# Patient Record
Sex: FEMALE | Race: WHITE | Employment: OTHER | URBAN - METROPOLITAN AREA
[De-identification: names, ages, dates, MRNs, and addresses within clinical notes are randomized per-mention and may not be internally consistent; named-entity substitution may affect disease eponyms.]

---

## 2017-01-16 ENCOUNTER — OFFICE VISIT (OUTPATIENT)
Dept: DERMATOLOGY CLINIC | Facility: CLINIC | Age: 62
End: 2017-01-16

## 2017-01-16 DIAGNOSIS — L82.1 SEBORRHEIC KERATOSES: ICD-10-CM

## 2017-01-16 DIAGNOSIS — D23.4 BENIGN NEOPLASM OF SCALP AND SKIN OF NECK: ICD-10-CM

## 2017-01-16 DIAGNOSIS — D23.60 BENIGN NEOPLASM OF SKIN OF UPPER LIMB, INCLUDING SHOULDER, UNSPECIFIED LATERALITY: ICD-10-CM

## 2017-01-16 DIAGNOSIS — D48.5 NEOPLASM OF UNCERTAIN BEHAVIOR OF SKIN: Primary | ICD-10-CM

## 2017-01-16 DIAGNOSIS — D23.30 BENIGN NEOPLASM OF SKIN OF FACE: ICD-10-CM

## 2017-01-16 DIAGNOSIS — D23.5 BENIGN NEOPLASM OF SKIN OF TRUNK, EXCEPT SCROTUM: ICD-10-CM

## 2017-01-16 PROCEDURE — 11100 BIOPSY OF SKIN LESION: CPT | Performed by: DERMATOLOGY

## 2017-01-16 PROCEDURE — 99202 OFFICE O/P NEW SF 15 MIN: CPT | Performed by: DERMATOLOGY

## 2017-01-17 ENCOUNTER — TELEPHONE (OUTPATIENT)
Dept: DERMATOLOGY CLINIC | Facility: CLINIC | Age: 62
End: 2017-01-17

## 2017-01-18 NOTE — TELEPHONE ENCOUNTER
Please send letter. SK  rtc 1 yr or prn. Please check off in log book letter sent. Please send report to scan.

## 2017-01-25 ENCOUNTER — OFFICE VISIT (OUTPATIENT)
Dept: DERMATOLOGY CLINIC | Facility: CLINIC | Age: 62
End: 2017-01-25

## 2017-01-25 ENCOUNTER — TELEPHONE (OUTPATIENT)
Dept: DERMATOLOGY CLINIC | Facility: CLINIC | Age: 62
End: 2017-01-25

## 2017-01-25 DIAGNOSIS — T14.8XXA WOUND INFECTION: Primary | ICD-10-CM

## 2017-01-25 DIAGNOSIS — L08.9 WOUND INFECTION: Primary | ICD-10-CM

## 2017-01-25 RX ORDER — AZITHROMYCIN 250 MG/1
TABLET, FILM COATED ORAL
Qty: 6 TABLET | Refills: 0 | Status: SHIPPED | OUTPATIENT
Start: 2017-01-25 | End: 2017-03-19

## 2017-01-25 NOTE — TELEPHONE ENCOUNTER
Pt calling, states her bx site from last visit with Chace Rowan is red, painful and swollen - denies pus, fever/chills.  appt made for today with KMT to further eval

## 2017-01-30 ENCOUNTER — TELEPHONE (OUTPATIENT)
Dept: INTERNAL MEDICINE CLINIC | Facility: CLINIC | Age: 62
End: 2017-01-30

## 2017-01-30 DIAGNOSIS — E55.9 VITAMIN D DEFICIENCY: Primary | ICD-10-CM

## 2017-01-30 DIAGNOSIS — R74.8 ELEVATED ALKALINE PHOSPHATASE LEVEL: ICD-10-CM

## 2017-01-30 NOTE — TELEPHONE ENCOUNTER
Nursing , please send order today for left on cart to patient. Thank you. (  Discussed with patient. He has low level elevation in alkaline phosphatase. GGT unremarkable.   We will send order for fractionated alkaline phosphatase along with repeat miguel

## 2017-01-31 NOTE — PROGRESS NOTES
Nella Aceves is a 60year old female. HPI:     CC:  Patient presents with:  Moles: New pt presenting with mole on back. Pt denies personal and family hx of skin cancer.         Allergies:  Penicillin G; Penicillins    HISTORY:    Past Medical History Yes    Comment: Coffee 2 cups daily;  Soda    Pt has a pacemaker No    Pt has a defibrillator No    Reaction to local anesthetic No     Social History Narrative     Family History   Problem Relation Age of Onset   • Alcohol and Other Disorders Associated Fa defined types were placed in this encounter.        Meds & Refills for this Visit:  No prescriptions requested or ordered in this encounter      Neoplasm of uncertain behavior of skin  (primary encounter diagnosis)  Seborrheic keratoses  Benign neoplasm of

## 2017-02-12 NOTE — PROGRESS NOTES
Gerry Hernandez is a 60year old female. Patient presents with:  Lesion: Pt had bx at L upper back 1-16. Area tender with pink ring around and yellow scab. Pt only covered for 2 days tho lesion is under clothes.   \"I just followed the written d file  Other Topics Concern    Caffeine Concern Yes    Comment: Coffee 2 cups daily;  Soda    Pt has a pacemaker No    Pt has a defibrillator No    Reaction to local anesthetic No     Social History Narrative     Family History   Problem Relation Age of Onse melanoma briefly reviewed. Sunscreen use, sun protection, encouraged. Followup as noted in rtc or p.r.n. No orders of the defined types were placed in this encounter.        Meds & Refills for this Visit:   Signed Prescriptions Disp Refills    azithrom

## 2017-02-20 ENCOUNTER — LAB ENCOUNTER (OUTPATIENT)
Dept: LAB | Facility: HOSPITAL | Age: 62
End: 2017-02-20
Attending: INTERNAL MEDICINE
Payer: COMMERCIAL

## 2017-02-20 DIAGNOSIS — E55.9 VITAMIN D DEFICIENCY: ICD-10-CM

## 2017-02-20 DIAGNOSIS — R74.8 ELEVATED ALKALINE PHOSPHATASE LEVEL: ICD-10-CM

## 2017-02-20 PROCEDURE — 82306 VITAMIN D 25 HYDROXY: CPT

## 2017-02-20 PROCEDURE — 36415 COLL VENOUS BLD VENIPUNCTURE: CPT

## 2017-02-20 PROCEDURE — 84080 ASSAY ALKALINE PHOSPHATASES: CPT

## 2017-02-20 PROCEDURE — 84075 ASSAY ALKALINE PHOSPHATASE: CPT

## 2017-02-21 PROCEDURE — 88175 CYTOPATH C/V AUTO FLUID REDO: CPT | Performed by: OBSTETRICS & GYNECOLOGY

## 2017-02-21 PROCEDURE — 87624 HPV HI-RISK TYP POOLED RSLT: CPT | Performed by: OBSTETRICS & GYNECOLOGY

## 2017-02-22 ENCOUNTER — LAB REQUISITION (OUTPATIENT)
Dept: LAB | Facility: HOSPITAL | Age: 62
End: 2017-02-22
Payer: COMMERCIAL

## 2017-02-22 DIAGNOSIS — Z01.419 ENCOUNTER FOR GYNECOLOGICAL EXAMINATION WITHOUT ABNORMAL FINDING: ICD-10-CM

## 2017-02-22 LAB — 25(OH)D3 SERPL-MCNC: 23.1 NG/ML

## 2017-02-24 LAB — HPV I/H RISK 1 DNA SPEC QL NAA+PROBE: NEGATIVE

## 2017-02-25 ENCOUNTER — TELEPHONE (OUTPATIENT)
Dept: INTERNAL MEDICINE CLINIC | Facility: CLINIC | Age: 62
End: 2017-02-25

## 2017-02-25 DIAGNOSIS — E55.9 VITAMIN D DEFICIENCY: Primary | ICD-10-CM

## 2017-02-25 NOTE — TELEPHONE ENCOUNTER
Please notify patient that her recent labs showed that  the one enzyme called alkaline phosphatase is now normal.  It would be wise to recheck that in 6 months to make sure it remains in the normal range. I will send her an order form for that.   Her vitam

## 2017-03-07 NOTE — TELEPHONE ENCOUNTER
DUKE Quiroga MD message to patient. Patient verbalized understanding. Patient states she has not been taking her Vitamin D3 regularly. Patient states when she does take it she is taking Vitamin D 3 1000 IU 1 tab daily.  Patient is not taking ca

## 2017-03-12 NOTE — TELEPHONE ENCOUNTER
Noted. Please have patient take her vitamin D 2000 units 1 a day. She does not have to take her calcium since it upsets her stomach. Send order for vitamin D to be done in 3  months.   Also I did check with Me the  at that makes the shingles

## 2017-03-15 NOTE — TELEPHONE ENCOUNTER
Patient called back - relayed DR. MOREJON message and she verbalized understanding.  Patient states she probably will go ahead and get the shingles vaccine

## 2017-03-19 ENCOUNTER — HOSPITAL ENCOUNTER (OUTPATIENT)
Age: 62
Discharge: HOME | End: 2017-03-19
Payer: COMMERCIAL

## 2017-03-19 VITALS
OXYGEN SATURATION: 99 % | HEART RATE: 59 BPM | BODY MASS INDEX: 21.71 KG/M2 | SYSTOLIC BLOOD PRESSURE: 115 MMHG | DIASTOLIC BLOOD PRESSURE: 59 MMHG | HEIGHT: 62 IN | RESPIRATION RATE: 16 BRPM | WEIGHT: 118 LBS | TEMPERATURE: 99 F

## 2017-03-19 DIAGNOSIS — B96.89 ACUTE BACTERIAL PHARYNGITIS: Primary | ICD-10-CM

## 2017-03-19 DIAGNOSIS — J02.8 ACUTE BACTERIAL PHARYNGITIS: Primary | ICD-10-CM

## 2017-03-19 LAB — S PYO AG THROAT QL: NEGATIVE

## 2017-03-19 PROCEDURE — 87430 STREP A AG IA: CPT

## 2017-03-19 PROCEDURE — 99214 OFFICE O/P EST MOD 30 MIN: CPT

## 2017-03-19 PROCEDURE — 99213 OFFICE O/P EST LOW 20 MIN: CPT

## 2017-03-19 RX ORDER — AZITHROMYCIN 500 MG/1
500 TABLET, FILM COATED ORAL DAILY
Qty: 5 TABLET | Refills: 0 | Status: SHIPPED | OUTPATIENT
Start: 2017-03-19 | End: 2017-03-24

## 2017-03-19 NOTE — ED PROVIDER NOTES
Patient presents with:  Sore Throat      HPI:     Garcia Solano is a 60year old female who presents for evaluation of a chief complaint of sore throat and body aches for the past 2 days. The patient was exposed to strep throat at work.   She denies a obvious rashes  NECK: supple, no adenopathy, no thyromegaly  CARDIO: RRR without murmur  EXTREMITIES: no cyanosis, clubbing or edema  HEAD: normocephalic, atraumatic  EYES: sclera non icteric bilateral, conjunctiva clear  EARS: TM  bilateral: normal  NOSE:

## 2017-03-19 NOTE — ED INITIAL ASSESSMENT (HPI)
Cold symptoms for a couple days. Sore throat became worse last night. 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 Low grade temp and chills. No N/V/D. Denies SOB.

## 2017-05-02 ENCOUNTER — HOSPITAL ENCOUNTER (OUTPATIENT)
Dept: BONE DENSITY | Facility: HOSPITAL | Age: 62
Discharge: HOME | End: 2017-05-02
Attending: OBSTETRICS & GYNECOLOGY
Payer: COMMERCIAL

## 2017-05-02 ENCOUNTER — HOSPITAL ENCOUNTER (OUTPATIENT)
Dept: MAMMOGRAPHY | Facility: HOSPITAL | Age: 62
Discharge: HOME | End: 2017-05-02
Attending: OBSTETRICS & GYNECOLOGY
Payer: COMMERCIAL

## 2017-05-02 DIAGNOSIS — Z78.0 MENOPAUSE: ICD-10-CM

## 2017-05-02 DIAGNOSIS — Z12.31 ENCOUNTER FOR SCREENING MAMMOGRAM FOR MALIGNANT NEOPLASM OF BREAST: ICD-10-CM

## 2017-05-02 PROCEDURE — 77067 SCR MAMMO BI INCL CAD: CPT

## 2017-05-02 PROCEDURE — 77080 DXA BONE DENSITY AXIAL: CPT

## 2017-06-05 ENCOUNTER — TELEPHONE (OUTPATIENT)
Dept: INTERNAL MEDICINE CLINIC | Facility: CLINIC | Age: 62
End: 2017-06-05

## 2017-06-05 NOTE — TELEPHONE ENCOUNTER
Copy of most recent Vit. D results (2/20/17) and TSH results (11/14/16) faxed to [facility] as requested

## 2017-11-02 ENCOUNTER — TELEPHONE (OUTPATIENT)
Dept: INTERNAL MEDICINE CLINIC | Facility: CLINIC | Age: 62
End: 2017-11-02

## 2017-11-02 NOTE — TELEPHONE ENCOUNTER
Please notify patient that her labs that I received came out good. Chemistries good. Cholesterol good. Vitamin D good.   Last physical was in December so it may be wise for her her to see me in December for updated physical.  She can call for an appointm

## 2017-11-17 NOTE — TELEPHONE ENCOUNTER
Patient called - relayed TAMMY MOREJON message and she verbalized understanding. Patient was at work so she will call to schedule appt.

## 2018-01-23 ENCOUNTER — OFFICE VISIT (OUTPATIENT)
Dept: INTERNAL MEDICINE CLINIC | Facility: CLINIC | Age: 63
End: 2018-01-23

## 2018-01-23 ENCOUNTER — TELEPHONE (OUTPATIENT)
Dept: INTERNAL MEDICINE CLINIC | Facility: CLINIC | Age: 63
End: 2018-01-23

## 2018-01-23 ENCOUNTER — HOSPITAL ENCOUNTER (OUTPATIENT)
Dept: GENERAL RADIOLOGY | Age: 63
Discharge: HOME | End: 2018-01-23
Attending: INTERNAL MEDICINE
Payer: COMMERCIAL

## 2018-01-23 VITALS
HEIGHT: 62 IN | HEART RATE: 69 BPM | TEMPERATURE: 98 F | SYSTOLIC BLOOD PRESSURE: 102 MMHG | WEIGHT: 117 LBS | OXYGEN SATURATION: 98 % | DIASTOLIC BLOOD PRESSURE: 70 MMHG | BODY MASS INDEX: 21.53 KG/M2

## 2018-01-23 DIAGNOSIS — J06.9 ACUTE UPPER RESPIRATORY INFECTION, UNSPECIFIED: Primary | ICD-10-CM

## 2018-01-23 DIAGNOSIS — J06.9 ACUTE UPPER RESPIRATORY INFECTION, UNSPECIFIED: ICD-10-CM

## 2018-01-23 PROCEDURE — 71046 X-RAY EXAM CHEST 2 VIEWS: CPT | Performed by: INTERNAL MEDICINE

## 2018-01-23 PROCEDURE — 99212 OFFICE O/P EST SF 10 MIN: CPT | Performed by: INTERNAL MEDICINE

## 2018-01-23 PROCEDURE — 99213 OFFICE O/P EST LOW 20 MIN: CPT | Performed by: INTERNAL MEDICINE

## 2018-01-23 RX ORDER — AZITHROMYCIN 250 MG/1
TABLET, FILM COATED ORAL
Qty: 6 TABLET | Refills: 0 | Status: SHIPPED | OUTPATIENT
Start: 2018-01-23 | End: 2018-12-03 | Stop reason: ALTCHOICE

## 2018-01-23 RX ORDER — ERGOCALCIFEROL 1.25 MG/1
1 CAPSULE ORAL WEEKLY
Refills: 0 | COMMUNITY
Start: 2017-12-30 | End: 2018-12-03

## 2018-01-23 RX ORDER — ALENDRONATE SODIUM 70 MG/1
70 TABLET ORAL WEEKLY
COMMUNITY
End: 2020-08-03

## 2018-01-23 NOTE — PROGRESS NOTES
Raúl Riley is a 60year old female. HPI:   Patient presents with:  Cough: productive cough, body aches, chills, PND, Sinus pressure x 3 days Fever x 2 days       As per nursing documentation. Patient states Saturday she developed a fever of 101. without exudate. EYES:  PERRL. Sclera anicteric. NECK:  Supple,  no adenopathy,   LUNGS: Some rhonchi are noticed in the base of her lungs. No crackles. No wheezing. Effort normal rate about 14. CARDIO:  RRR without murmur.    S1 and S2 normal  GI:  g

## 2018-08-07 PROBLEM — H26.9 BILATERAL INCIPIENT CATARACTS: Status: ACTIVE | Noted: 2018-08-07

## 2018-08-07 PROBLEM — D31.32 CHOROIDAL NEVUS, LEFT EYE: Status: ACTIVE | Noted: 2018-08-07

## 2018-12-03 ENCOUNTER — OFFICE VISIT (OUTPATIENT)
Dept: INTERNAL MEDICINE CLINIC | Facility: CLINIC | Age: 63
End: 2018-12-03
Payer: COMMERCIAL

## 2018-12-03 ENCOUNTER — TELEPHONE (OUTPATIENT)
Dept: INTERNAL MEDICINE CLINIC | Facility: CLINIC | Age: 63
End: 2018-12-03

## 2018-12-03 VITALS
BODY MASS INDEX: 21.71 KG/M2 | WEIGHT: 118 LBS | OXYGEN SATURATION: 98 % | HEIGHT: 62 IN | SYSTOLIC BLOOD PRESSURE: 112 MMHG | DIASTOLIC BLOOD PRESSURE: 70 MMHG | TEMPERATURE: 98 F | HEART RATE: 62 BPM

## 2018-12-03 DIAGNOSIS — M85.80 OSTEOPENIA, UNSPECIFIED LOCATION: ICD-10-CM

## 2018-12-03 DIAGNOSIS — E04.2 MULTIPLE THYROID NODULES: Primary | ICD-10-CM

## 2018-12-03 DIAGNOSIS — Z12.31 VISIT FOR SCREENING MAMMOGRAM: ICD-10-CM

## 2018-12-03 DIAGNOSIS — Z00.00 ANNUAL PHYSICAL EXAM: Primary | ICD-10-CM

## 2018-12-03 DIAGNOSIS — Z00.00 ROUTINE HEALTH MAINTENANCE: ICD-10-CM

## 2018-12-03 DIAGNOSIS — E55.9 VITAMIN D DEFICIENCY: ICD-10-CM

## 2018-12-03 DIAGNOSIS — Z11.59 ENCOUNTER FOR HEPATITIS C SCREENING TEST FOR LOW RISK PATIENT: ICD-10-CM

## 2018-12-03 PROBLEM — R91.8 PULMONARY NODULES: Status: ACTIVE | Noted: 2018-12-03

## 2018-12-03 PROBLEM — Z87.39 HISTORY OF OSTEOPENIA: Status: ACTIVE | Noted: 2018-12-03

## 2018-12-03 PROBLEM — Z14.8 CARRIER OF FRAGILE X SYNDROME: Status: ACTIVE | Noted: 2018-12-03

## 2018-12-03 PROCEDURE — 99214 OFFICE O/P EST MOD 30 MIN: CPT | Performed by: INTERNAL MEDICINE

## 2018-12-03 PROCEDURE — 99212 OFFICE O/P EST SF 10 MIN: CPT | Performed by: INTERNAL MEDICINE

## 2018-12-03 NOTE — PROGRESS NOTES
HPI:   Kristina Trinidad is a 58year old female presents with the following problems. Patient presents for annual physical.  She has no acute complaints. She is a carrier of fragile X syndrome. She is asymptomatic.   Her daughter was diagnosed as h • Other (Other[other]) Daughter         Autoimmune, platelet problem.  Daughter is fragile X carrier   • Diabetes Other         family h/o DM     Results for orders placed or performed during the hospital encounter of 03/19/17   [address] [place] [address] POCT RAPID STREP   Res 2020 for history of polyps. MUSCULOSKELETAL:  back is not tender, no joint swelling  EXTREMITIES:  no cyanosis, clubbing or edema. NEURO:  Awake and aware. ASSESSMENT AND PLAN:         1.  Annual physical exam  Annual physical.    2. Osteopenia, unsp

## 2018-12-07 ENCOUNTER — LAB ENCOUNTER (OUTPATIENT)
Dept: LAB | Age: 63
End: 2018-12-07
Attending: INTERNAL MEDICINE
Payer: COMMERCIAL

## 2018-12-07 DIAGNOSIS — Z00.00 ANNUAL PHYSICAL EXAM: ICD-10-CM

## 2018-12-07 DIAGNOSIS — M85.80 OSTEOPENIA, UNSPECIFIED LOCATION: ICD-10-CM

## 2018-12-07 DIAGNOSIS — Z11.59 ENCOUNTER FOR HEPATITIS C SCREENING TEST FOR LOW RISK PATIENT: ICD-10-CM

## 2018-12-07 DIAGNOSIS — E55.9 VITAMIN D DEFICIENCY: ICD-10-CM

## 2018-12-07 PROCEDURE — 82306 VITAMIN D 25 HYDROXY: CPT

## 2018-12-07 PROCEDURE — 80053 COMPREHEN METABOLIC PANEL: CPT

## 2018-12-07 PROCEDURE — 85025 COMPLETE CBC W/AUTO DIFF WBC: CPT

## 2018-12-07 PROCEDURE — 81003 URINALYSIS AUTO W/O SCOPE: CPT | Performed by: INTERNAL MEDICINE

## 2018-12-07 PROCEDURE — 36415 COLL VENOUS BLD VENIPUNCTURE: CPT

## 2018-12-07 PROCEDURE — 86803 HEPATITIS C AB TEST: CPT

## 2018-12-07 PROCEDURE — 80061 LIPID PANEL: CPT

## 2018-12-07 PROCEDURE — 84443 ASSAY THYROID STIM HORMONE: CPT

## 2018-12-10 ENCOUNTER — TELEPHONE (OUTPATIENT)
Dept: INTERNAL MEDICINE CLINIC | Facility: CLINIC | Age: 63
End: 2018-12-10

## 2018-12-10 ENCOUNTER — HOSPITAL ENCOUNTER (OUTPATIENT)
Dept: ULTRASOUND IMAGING | Facility: HOSPITAL | Age: 63
Discharge: HOME | End: 2018-12-10
Attending: INTERNAL MEDICINE
Payer: COMMERCIAL

## 2018-12-10 DIAGNOSIS — E04.2 MULTIPLE THYROID NODULES: ICD-10-CM

## 2018-12-10 PROCEDURE — 76536 US EXAM OF HEAD AND NECK: CPT | Performed by: INTERNAL MEDICINE

## 2018-12-10 NOTE — TELEPHONE ENCOUNTER
Please notify patient that I thought her labs and urinalysis look good. Her vitamin D was 25. We usually like it greater than 30. Please ask her to refresh my memory about her calcium and vitamin D supplement and the dosage she may or may not be on.

## 2018-12-11 NOTE — TELEPHONE ENCOUNTER
Please let patient know that her vitamin D level came out 25. This is a little low. The rest of her labs came out good. Thyroid good. Chemistries good. Cholesterol I think is in a good range. Her blood count is good.   Her thyroid just showed 2 very sm

## 2019-01-02 ENCOUNTER — TELEPHONE (OUTPATIENT)
Dept: INTERNAL MEDICINE CLINIC | Facility: CLINIC | Age: 64
End: 2019-01-02

## 2019-01-02 ENCOUNTER — HOSPITAL ENCOUNTER (OUTPATIENT)
Dept: MAMMOGRAPHY | Age: 64
Discharge: HOME | End: 2019-01-02
Attending: INTERNAL MEDICINE
Payer: COMMERCIAL

## 2019-01-02 DIAGNOSIS — Z12.31 VISIT FOR SCREENING MAMMOGRAM: ICD-10-CM

## 2019-01-02 PROCEDURE — 77067 SCR MAMMO BI INCL CAD: CPT | Performed by: INTERNAL MEDICINE

## 2019-01-02 PROCEDURE — 77063 BREAST TOMOSYNTHESIS BI: CPT | Performed by: INTERNAL MEDICINE

## 2019-01-02 NOTE — TELEPHONE ENCOUNTER
Relayed Dr. Francheska Hubbard message to patient. She verbalized understanding. I invited her to call back with any questions or concerns.

## 2019-01-02 NOTE — TELEPHONE ENCOUNTER
Please let patient know the radiologist thought there was some asymmetry in her right breast. She will be recalled for some additional views. Please let her know this is not unusual. Hopefully repeat images will turn out well.  I will await radiology to sen

## 2019-01-17 ENCOUNTER — HOSPITAL ENCOUNTER (OUTPATIENT)
Dept: MAMMOGRAPHY | Facility: HOSPITAL | Age: 64
Discharge: HOME | End: 2019-01-17
Attending: INTERNAL MEDICINE
Payer: COMMERCIAL

## 2019-01-17 ENCOUNTER — HOSPITAL ENCOUNTER (OUTPATIENT)
Dept: ULTRASOUND IMAGING | Facility: HOSPITAL | Age: 64
Discharge: HOME | End: 2019-01-17
Attending: INTERNAL MEDICINE
Payer: COMMERCIAL

## 2019-01-17 DIAGNOSIS — R92.8 ABNORMAL MAMMOGRAM: ICD-10-CM

## 2019-01-17 PROCEDURE — 77065 DX MAMMO INCL CAD UNI: CPT | Performed by: INTERNAL MEDICINE

## 2019-01-17 PROCEDURE — 76642 ULTRASOUND BREAST LIMITED: CPT | Performed by: INTERNAL MEDICINE

## 2019-01-17 PROCEDURE — 77061 BREAST TOMOSYNTHESIS UNI: CPT | Performed by: INTERNAL MEDICINE

## 2019-01-20 ENCOUNTER — TELEPHONE (OUTPATIENT)
Dept: INTERNAL MEDICINE CLINIC | Facility: CLINIC | Age: 64
End: 2019-01-20

## 2019-01-20 NOTE — TELEPHONE ENCOUNTER
Discussed with patient about about benign mammogram. Exam was benign. No family history of breast cancer. Radiology raised issue of whole breast automated ultrasound.  I discussed I would like her to to see Dr. Elizabeth Burger for opinion on whether this would be ne

## 2019-02-06 ENCOUNTER — OFFICE VISIT (OUTPATIENT)
Dept: SURGERY | Facility: CLINIC | Age: 64
End: 2019-02-06
Payer: COMMERCIAL

## 2019-02-06 VITALS
RESPIRATION RATE: 16 BRPM | DIASTOLIC BLOOD PRESSURE: 48 MMHG | SYSTOLIC BLOOD PRESSURE: 115 MMHG | OXYGEN SATURATION: 98 %

## 2019-02-06 DIAGNOSIS — R92.2 INCONCLUSIVE MAMMOGRAM DUE TO DENSE BREASTS: Primary | ICD-10-CM

## 2019-02-06 DIAGNOSIS — R92.2 DENSE BREAST: ICD-10-CM

## 2019-02-06 PROCEDURE — 99204 OFFICE O/P NEW MOD 45 MIN: CPT | Performed by: SURGERY

## 2019-02-06 RX ORDER — ZOSTER VACCINE RECOMBINANT, ADJUVANTED 50 MCG/0.5
KIT INTRAMUSCULAR
Refills: 0 | COMMUNITY
Start: 2018-12-29 | End: 2020-07-27

## 2019-02-07 NOTE — PROGRESS NOTES
Breast Surgery New Patient Consultation    This is the first visit for this 71year old woman, referred by Dr. Jan Ling, who presents for evaluation of abnormal imaging.     History of Present Illness:   Ms. Rachel Jean is a 71year old woman who UNKNOWN  Penicillins             RASH    Comment:Rash when young child    Family History:   Family History   Problem Relation Age of Onset   • Alcohol and Other Disorders Associated Father         cirrhosis from etoh   • Cancer Mother 61        lung   • Ot indigestion, nausea, change in bowel habits, diarrhea, abdominal pain or vomiting blood.      Genitourinary:  The patient denies frequent urination, needing to get up at night to urinate, urinary hesitancy or retaining urine, painful urination, urinary inco auscultation. Heart: The rhythm is regular. There are no murmurs, rubs, gallops or thrills. Breasts:  Her breasts are symmetrical with a cup size B.   Right breast: The skin, nipple ,and areola appear normal. There is no skin dimpling with movement o candidate to consider automated whole breast ultrasound as a supplement to the mammographic evaluation. She is motivated for proceeding with this and will contact her with the results when they are available.   I anticipate if this is within normal limits,

## 2019-06-04 ENCOUNTER — HOSPITAL ENCOUNTER (OUTPATIENT)
Dept: ULTRASOUND IMAGING | Facility: HOSPITAL | Age: 64
Discharge: HOME | End: 2019-06-04
Attending: SURGERY
Payer: COMMERCIAL

## 2019-06-04 DIAGNOSIS — R92.2 DENSE BREAST: ICD-10-CM

## 2019-06-04 DIAGNOSIS — R92.2 INCONCLUSIVE MAMMOGRAM DUE TO DENSE BREASTS: ICD-10-CM

## 2019-06-04 PROCEDURE — 76641 ULTRASOUND BREAST COMPLETE: CPT | Performed by: SURGERY

## 2019-08-08 PROBLEM — H43.392 VITREOUS SYNERESIS OF LEFT EYE: Status: ACTIVE | Noted: 2019-08-08

## 2019-08-08 PROBLEM — H04.123 DRY EYE SYNDROME OF BILATERAL LACRIMAL GLANDS: Status: ACTIVE | Noted: 2019-08-08

## 2019-10-07 PROBLEM — H43.811 POSTERIOR VITREOUS DETACHMENT OF RIGHT EYE: Status: ACTIVE | Noted: 2019-10-07

## 2020-07-27 ENCOUNTER — OFFICE VISIT (OUTPATIENT)
Dept: INTERNAL MEDICINE CLINIC | Facility: CLINIC | Age: 65
End: 2020-07-27
Payer: COMMERCIAL

## 2020-07-27 VITALS
DIASTOLIC BLOOD PRESSURE: 80 MMHG | TEMPERATURE: 98 F | BODY MASS INDEX: 22.97 KG/M2 | HEIGHT: 62 IN | OXYGEN SATURATION: 97 % | WEIGHT: 124.81 LBS | HEART RATE: 70 BPM | SYSTOLIC BLOOD PRESSURE: 130 MMHG

## 2020-07-27 DIAGNOSIS — E55.9 VITAMIN D DEFICIENCY: ICD-10-CM

## 2020-07-27 DIAGNOSIS — Z00.00 ROUTINE HEALTH MAINTENANCE: ICD-10-CM

## 2020-07-27 DIAGNOSIS — Z12.31 VISIT FOR SCREENING MAMMOGRAM: ICD-10-CM

## 2020-07-27 DIAGNOSIS — E04.2 MULTIPLE THYROID NODULES: ICD-10-CM

## 2020-07-27 DIAGNOSIS — M85.80 OSTEOPENIA, UNSPECIFIED LOCATION: ICD-10-CM

## 2020-07-27 DIAGNOSIS — Z11.59 ENCOUNTER FOR HEPATITIS C SCREENING TEST FOR LOW RISK PATIENT: ICD-10-CM

## 2020-07-27 DIAGNOSIS — I51.7 RIGHT ATRIAL ENLARGEMENT: ICD-10-CM

## 2020-07-27 DIAGNOSIS — E78.5 HYPERLIPIDEMIA, UNSPECIFIED HYPERLIPIDEMIA TYPE: ICD-10-CM

## 2020-07-27 DIAGNOSIS — R42 DIZZINESS: Primary | ICD-10-CM

## 2020-07-27 PROBLEM — Z86.0100 HISTORY OF COLON POLYPS: Status: ACTIVE | Noted: 2020-07-27

## 2020-07-27 PROBLEM — Z86.010 HISTORY OF COLON POLYPS: Status: ACTIVE | Noted: 2020-07-27

## 2020-07-27 PROCEDURE — 93000 ELECTROCARDIOGRAM COMPLETE: CPT | Performed by: INTERNAL MEDICINE

## 2020-07-27 PROCEDURE — 99214 OFFICE O/P EST MOD 30 MIN: CPT | Performed by: INTERNAL MEDICINE

## 2020-07-27 NOTE — PROGRESS NOTES
HPI:   Sharon Wahl is a 76year old female presents with the following problems. Patient reports that about 2 and half weeks ago her Jered Pj were not in focus. \". This was not double vision. She has had some discomfort above both eyes.   No lung   • Other (Other) Daughter         Autoimmune, platelet problem.  Daughter is fragile X carrier   • Diabetes Other         family h/o DM   • Cancer Paternal Grandfather 79        lung     Results for orders placed or performed in visit on 12/07/18   CO Absolute 2.7 1.8 - 7.7 K/UL    Lymphocyte Absolute 1.3 1.0 - 4.0 K/UL    Monocyte Absolute 0.4 0.0 - 1.0 K/UL    Eosinophil Absolute 0.1 0.0 - 0.7 K/UL    Basophil Absolute 0.1 0.0 - 0.2 K/UL      Social History:   Social History    Tobacco Use      Smokin Heel-to-toe normal.  When she did lay back flat I did appreciate some nystagmus and she did have a spinning sensation    ASSESSMENT AND PLAN:         1. Dizziness  Dizziness that seems to be vertiginous peripheral in nature.   Because of the discomfort abov

## 2020-07-28 ENCOUNTER — LAB ENCOUNTER (OUTPATIENT)
Dept: LAB | Facility: HOSPITAL | Age: 65
End: 2020-07-28
Attending: INTERNAL MEDICINE
Payer: COMMERCIAL

## 2020-07-28 ENCOUNTER — TELEPHONE (OUTPATIENT)
Dept: INTERNAL MEDICINE CLINIC | Facility: CLINIC | Age: 65
End: 2020-07-28

## 2020-07-28 DIAGNOSIS — R42 DIZZINESS: ICD-10-CM

## 2020-07-28 DIAGNOSIS — E78.5 HYPERLIPIDEMIA, UNSPECIFIED HYPERLIPIDEMIA TYPE: ICD-10-CM

## 2020-07-28 DIAGNOSIS — E55.9 VITAMIN D DEFICIENCY: ICD-10-CM

## 2020-07-28 DIAGNOSIS — Z11.59 ENCOUNTER FOR HEPATITIS C SCREENING TEST FOR LOW RISK PATIENT: ICD-10-CM

## 2020-07-28 LAB
ALBUMIN SERPL-MCNC: 4 G/DL (ref 3.4–5)
ALBUMIN/GLOB SERPL: 1.4 {RATIO} (ref 1–2)
ALP LIVER SERPL-CCNC: 100 U/L (ref 55–142)
ALT SERPL-CCNC: 19 U/L (ref 13–56)
ANION GAP SERPL CALC-SCNC: 6 MMOL/L (ref 0–18)
AST SERPL-CCNC: 20 U/L (ref 15–37)
BASOPHILS # BLD AUTO: 0.06 X10(3) UL (ref 0–0.2)
BASOPHILS NFR BLD AUTO: 1.1 %
BILIRUB SERPL-MCNC: 0.8 MG/DL (ref 0.1–2)
BILIRUB UR QL: NEGATIVE
BUN BLD-MCNC: 12 MG/DL (ref 7–18)
BUN/CREAT SERPL: 16.4 (ref 10–20)
CALCIUM BLD-MCNC: 9.4 MG/DL (ref 8.5–10.1)
CHLORIDE SERPL-SCNC: 110 MMOL/L (ref 98–112)
CHOLEST SMN-MCNC: 196 MG/DL (ref ?–200)
CLARITY UR: CLEAR
CO2 SERPL-SCNC: 28 MMOL/L (ref 21–32)
COLOR UR: YELLOW
CREAT BLD-MCNC: 0.73 MG/DL (ref 0.55–1.02)
DEPRECATED RDW RBC AUTO: 40.1 FL (ref 35.1–46.3)
EOSINOPHIL # BLD AUTO: 0.05 X10(3) UL (ref 0–0.7)
EOSINOPHIL NFR BLD AUTO: 0.9 %
ERYTHROCYTE [DISTWIDTH] IN BLOOD BY AUTOMATED COUNT: 12.9 % (ref 11–15)
GLOBULIN PLAS-MCNC: 2.8 G/DL (ref 2.8–4.4)
GLUCOSE BLD-MCNC: 82 MG/DL (ref 70–99)
GLUCOSE UR-MCNC: NEGATIVE MG/DL
HCT VFR BLD AUTO: 43.7 % (ref 35–48)
HCV AB SERPL QL IA: NONREACTIVE
HDLC SERPL-MCNC: 78 MG/DL (ref 40–59)
HGB BLD-MCNC: 14 G/DL (ref 12–16)
HGB UR QL STRIP.AUTO: NEGATIVE
IMM GRANULOCYTES # BLD AUTO: 0.01 X10(3) UL (ref 0–1)
IMM GRANULOCYTES NFR BLD: 0.2 %
LDLC SERPL CALC-MCNC: 106 MG/DL (ref ?–100)
LEUKOCYTE ESTERASE UR QL STRIP.AUTO: NEGATIVE
LYMPHOCYTES # BLD AUTO: 1.38 X10(3) UL (ref 1–4)
LYMPHOCYTES NFR BLD AUTO: 24.4 %
M PROTEIN MFR SERPL ELPH: 6.8 G/DL (ref 6.4–8.2)
MCH RBC QN AUTO: 27.5 PG (ref 26–34)
MCHC RBC AUTO-ENTMCNC: 32 G/DL (ref 31–37)
MCV RBC AUTO: 85.7 FL (ref 80–100)
MONOCYTES # BLD AUTO: 0.43 X10(3) UL (ref 0.1–1)
MONOCYTES NFR BLD AUTO: 7.6 %
NEUTROPHILS # BLD AUTO: 3.72 X10 (3) UL (ref 1.5–7.7)
NEUTROPHILS # BLD AUTO: 3.72 X10(3) UL (ref 1.5–7.7)
NEUTROPHILS NFR BLD AUTO: 65.8 %
NITRITE UR QL STRIP.AUTO: NEGATIVE
NONHDLC SERPL-MCNC: 118 MG/DL (ref ?–130)
OSMOLALITY SERPL CALC.SUM OF ELEC: 297 MOSM/KG (ref 275–295)
PATIENT FASTING Y/N/NP: YES
PATIENT FASTING Y/N/NP: YES
PH UR: 7 [PH] (ref 5–8)
PLATELET # BLD AUTO: 225 10(3)UL (ref 150–450)
POTASSIUM SERPL-SCNC: 4.4 MMOL/L (ref 3.5–5.1)
PROT UR-MCNC: NEGATIVE MG/DL
RBC # BLD AUTO: 5.1 X10(6)UL (ref 3.8–5.3)
SODIUM SERPL-SCNC: 144 MMOL/L (ref 136–145)
SP GR UR STRIP: 1.01 (ref 1–1.03)
TRIGL SERPL-MCNC: 61 MG/DL (ref 30–149)
TSI SER-ACNC: 1.5 MIU/ML (ref 0.36–3.74)
UROBILINOGEN UR STRIP-ACNC: <2
VLDLC SERPL CALC-MCNC: 12 MG/DL (ref 0–30)
WBC # BLD AUTO: 5.7 X10(3) UL (ref 4–11)

## 2020-07-28 PROCEDURE — 81003 URINALYSIS AUTO W/O SCOPE: CPT | Performed by: INTERNAL MEDICINE

## 2020-07-28 PROCEDURE — 85025 COMPLETE CBC W/AUTO DIFF WBC: CPT

## 2020-07-28 PROCEDURE — 36415 COLL VENOUS BLD VENIPUNCTURE: CPT

## 2020-07-28 PROCEDURE — 80061 LIPID PANEL: CPT

## 2020-07-28 PROCEDURE — 80053 COMPREHEN METABOLIC PANEL: CPT

## 2020-07-28 PROCEDURE — 86803 HEPATITIS C AB TEST: CPT

## 2020-07-28 PROCEDURE — 82306 VITAMIN D 25 HYDROXY: CPT

## 2020-07-28 PROCEDURE — 84443 ASSAY THYROID STIM HORMONE: CPT

## 2020-07-28 NOTE — TELEPHONE ENCOUNTER
Please let patient know that her labs look good minus the vitamin D which is still being worked on. We will call her with those results but I would let her know there other labs look satisfactory.     In addition after she left I did recognize that her las

## 2020-07-29 NOTE — TELEPHONE ENCOUNTER
As FYI to DR. MOREJON - called patient and relayed DR. MOREJON message and information for DR. Doris Stuart given to patient.   She states she has dequan with neurologist 8/20/20 - they will call her if dequan is available sooner

## 2020-07-30 LAB — 25(OH)D3 SERPL-MCNC: 31 NG/ML (ref 30–100)

## 2020-07-30 NOTE — TELEPHONE ENCOUNTER
Dr. Ashley King, I spoke with the neurology office and they were able to move the patient's appointment to Monday, August 3 at 10:30 with Dr. Annette Buckley. I spoke with Reanna Oakes while I was on hold with neurology and she confirmed that this appointment worked for her.  Oliver Gregorio

## 2020-07-30 NOTE — TELEPHONE ENCOUNTER
Please let patient know that her vitamin D level came out 31. Authorities usually like it above 30. Therefore she should continue her vitamin D supplement and continue to take it consistently.

## 2020-07-30 NOTE — TELEPHONE ENCOUNTER
Message noted. Please call 's office at [phone] and see if they could move up patient's appointment. Thank you.

## 2020-07-31 NOTE — TELEPHONE ENCOUNTER
Spoke with pt to relay MD message below. Pt voiced understanding and confirms she has been taking it regularly.

## 2020-08-04 ENCOUNTER — HOSPITAL ENCOUNTER (OUTPATIENT)
Dept: CV DIAGNOSTICS | Facility: HOSPITAL | Age: 65
Discharge: HOME | End: 2020-08-04
Attending: INTERNAL MEDICINE
Payer: COMMERCIAL

## 2020-08-04 DIAGNOSIS — I51.7 RIGHT ATRIAL ENLARGEMENT: ICD-10-CM

## 2020-08-04 PROCEDURE — 93306 TTE W/DOPPLER COMPLETE: CPT | Performed by: INTERNAL MEDICINE

## 2020-08-12 ENCOUNTER — TELEPHONE (OUTPATIENT)
Dept: INTERNAL MEDICINE CLINIC | Facility: CLINIC | Age: 65
End: 2020-08-12

## 2020-08-12 DIAGNOSIS — M77.9 INFLAMMATION AROUND JOINT: Primary | ICD-10-CM

## 2020-08-12 NOTE — TELEPHONE ENCOUNTER
Discussed with patient. 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 No shortness of breath.   I did actually review with  who read the echocardiogram neck steps and it was decided after discu

## 2020-08-13 ENCOUNTER — LAB ENCOUNTER (OUTPATIENT)
Dept: LAB | Facility: HOSPITAL | Age: 65
End: 2020-08-13
Attending: INTERNAL MEDICINE
Payer: COMMERCIAL

## 2020-08-13 DIAGNOSIS — M77.9 INFLAMMATION AROUND JOINT: ICD-10-CM

## 2020-08-13 LAB
BASOPHILS # BLD AUTO: 0.06 X10(3) UL (ref 0–0.2)
BASOPHILS NFR BLD AUTO: 0.9 %
CRP SERPL-MCNC: <0.29 MG/DL (ref ?–0.3)
DEPRECATED RDW RBC AUTO: 39.8 FL (ref 35.1–46.3)
EOSINOPHIL # BLD AUTO: 0.23 X10(3) UL (ref 0–0.7)
EOSINOPHIL NFR BLD AUTO: 3.6 %
ERYTHROCYTE [DISTWIDTH] IN BLOOD BY AUTOMATED COUNT: 12.9 % (ref 11–15)
ERYTHROCYTE [SEDIMENTATION RATE] IN BLOOD: 6 MM/HR (ref 0–30)
HCT VFR BLD AUTO: 42.3 % (ref 35–48)
HGB BLD-MCNC: 13.7 G/DL (ref 12–16)
IMM GRANULOCYTES # BLD AUTO: 0.01 X10(3) UL (ref 0–1)
IMM GRANULOCYTES NFR BLD: 0.2 %
LYMPHOCYTES # BLD AUTO: 1.83 X10(3) UL (ref 1–4)
LYMPHOCYTES NFR BLD AUTO: 28.6 %
MCH RBC QN AUTO: 27.4 PG (ref 26–34)
MCHC RBC AUTO-ENTMCNC: 32.4 G/DL (ref 31–37)
MCV RBC AUTO: 84.6 FL (ref 80–100)
MONOCYTES # BLD AUTO: 0.62 X10(3) UL (ref 0.1–1)
MONOCYTES NFR BLD AUTO: 9.7 %
NEUTROPHILS # BLD AUTO: 3.64 X10 (3) UL (ref 1.5–7.7)
NEUTROPHILS # BLD AUTO: 3.64 X10(3) UL (ref 1.5–7.7)
NEUTROPHILS NFR BLD AUTO: 57 %
PLATELET # BLD AUTO: 217 10(3)UL (ref 150–450)
RBC # BLD AUTO: 5 X10(6)UL (ref 3.8–5.3)
WBC # BLD AUTO: 6.4 X10(3) UL (ref 4–11)

## 2020-08-13 PROCEDURE — 86140 C-REACTIVE PROTEIN: CPT

## 2020-08-13 PROCEDURE — 85652 RBC SED RATE AUTOMATED: CPT

## 2020-08-13 PROCEDURE — 85025 COMPLETE CBC W/AUTO DIFF WBC: CPT

## 2020-08-13 PROCEDURE — 36415 COLL VENOUS BLD VENIPUNCTURE: CPT

## 2020-10-19 ENCOUNTER — TELEPHONE (OUTPATIENT)
Dept: SURGERY | Facility: CLINIC | Age: 65
End: 2020-10-19

## 2020-10-19 DIAGNOSIS — N63.10 BREAST MASS, RIGHT: Primary | ICD-10-CM

## 2020-10-19 NOTE — TELEPHONE ENCOUNTER
Called patient's home phone. Unable to leave sensitive information on this number. Introduced self and instructed to call our office back.

## 2020-10-22 ENCOUNTER — HOSPITAL ENCOUNTER (OUTPATIENT)
Dept: ULTRASOUND IMAGING | Facility: HOSPITAL | Age: 65
Discharge: HOME | End: 2020-10-22
Attending: PHYSICIAN ASSISTANT
Payer: COMMERCIAL

## 2020-10-22 ENCOUNTER — HOSPITAL ENCOUNTER (OUTPATIENT)
Dept: MAMMOGRAPHY | Facility: HOSPITAL | Age: 65
Discharge: HOME | End: 2020-10-22
Attending: PHYSICIAN ASSISTANT
Payer: COMMERCIAL

## 2020-10-22 DIAGNOSIS — N63.10 BREAST MASS, RIGHT: ICD-10-CM

## 2020-10-22 PROCEDURE — 77062 BREAST TOMOSYNTHESIS BI: CPT | Performed by: PHYSICIAN ASSISTANT

## 2020-10-22 PROCEDURE — 77066 DX MAMMO INCL CAD BI: CPT | Performed by: PHYSICIAN ASSISTANT

## 2020-10-22 PROCEDURE — 76642 ULTRASOUND BREAST LIMITED: CPT | Performed by: PHYSICIAN ASSISTANT

## 2021-01-18 NOTE — PROGRESS NOTES
Breast Surgery Surveillance Visit    History of Present Illness:   Ms. Claire Mccollum is a 76year old woman who presents with a prior abnormal mammogram.  She acutely denies any palpable masses, nipple discharge, skin changes or axillary symptom MD.      Allergies:      Penicillin G            UNKNOWN  Penicillins             RASH    Comment:Rash when young child    Family History:   Family History   Problem Relation Age of Onset   • Alcohol and Other Disorders Associated Father         cirrhosis vomiting, dark or bloody stools, constipation, yellowing of the skin, indigestion, nausea, change in bowel habits, diarrhea, abdominal pain or vomiting blood.      Genitourinary:  The patient denies frequent urination, needing to get up at night to urinate, masses. The trachea is in the midline. Conjunctiva are clear, non-icteric. Chest: The chest expands symmetrically. The lungs are clear to auscultation. Heart: The rhythm is regular. There are no murmurs, rubs, gallops or thrills.     Breasts:  Her br October 2021. In light of her density, she will benefit from an annual bilateral breast ultrasound as well and this has been ordered. She was given ample opportunity for questions and those questions were answered to her satisfaction.  She has been encour

## 2021-01-20 ENCOUNTER — OFFICE VISIT (OUTPATIENT)
Dept: SURGERY | Facility: CLINIC | Age: 66
End: 2021-01-20
Payer: COMMERCIAL

## 2021-01-20 VITALS
DIASTOLIC BLOOD PRESSURE: 63 MMHG | BODY MASS INDEX: 22.82 KG/M2 | RESPIRATION RATE: 18 BRPM | HEART RATE: 62 BPM | WEIGHT: 124 LBS | SYSTOLIC BLOOD PRESSURE: 132 MMHG | OXYGEN SATURATION: 100 % | HEIGHT: 62 IN

## 2021-01-20 DIAGNOSIS — N63.10 BREAST MASS, RIGHT: ICD-10-CM

## 2021-01-20 DIAGNOSIS — Z12.31 ENCOUNTER FOR SCREENING MAMMOGRAM FOR BREAST CANCER: ICD-10-CM

## 2021-01-20 DIAGNOSIS — R92.2 DENSE BREAST: Primary | ICD-10-CM

## 2021-01-20 PROCEDURE — 99214 OFFICE O/P EST MOD 30 MIN: CPT | Performed by: SURGERY

## 2021-01-20 PROCEDURE — 3008F BODY MASS INDEX DOCD: CPT | Performed by: SURGERY

## 2021-01-20 PROCEDURE — 3078F DIAST BP <80 MM HG: CPT | Performed by: SURGERY

## 2021-01-20 PROCEDURE — 3075F SYST BP GE 130 - 139MM HG: CPT | Performed by: SURGERY

## 2021-02-08 ENCOUNTER — TELEPHONE (OUTPATIENT)
Dept: INTERNAL MEDICINE CLINIC | Facility: CLINIC | Age: 66
End: 2021-02-08

## 2021-02-08 ENCOUNTER — OFFICE VISIT (OUTPATIENT)
Dept: INTERNAL MEDICINE CLINIC | Facility: CLINIC | Age: 66
End: 2021-02-08
Payer: COMMERCIAL

## 2021-02-08 ENCOUNTER — LAB ENCOUNTER (OUTPATIENT)
Dept: LAB | Age: 66
End: 2021-02-08
Attending: INTERNAL MEDICINE
Payer: COMMERCIAL

## 2021-02-08 VITALS
SYSTOLIC BLOOD PRESSURE: 122 MMHG | OXYGEN SATURATION: 98 % | TEMPERATURE: 98 F | BODY MASS INDEX: 22.89 KG/M2 | WEIGHT: 124.38 LBS | HEIGHT: 62 IN | DIASTOLIC BLOOD PRESSURE: 72 MMHG | HEART RATE: 72 BPM

## 2021-02-08 DIAGNOSIS — G89.29 CHRONIC LLQ PAIN: ICD-10-CM

## 2021-02-08 DIAGNOSIS — R10.32 CHRONIC LLQ PAIN: ICD-10-CM

## 2021-02-08 DIAGNOSIS — R10.10 PAIN OF UPPER ABDOMEN: Primary | ICD-10-CM

## 2021-02-08 DIAGNOSIS — R10.10 PAIN OF UPPER ABDOMEN: ICD-10-CM

## 2021-02-08 DIAGNOSIS — Z00.00 ROUTINE HEALTH MAINTENANCE: ICD-10-CM

## 2021-02-08 LAB
ALBUMIN SERPL-MCNC: 4 G/DL (ref 3.4–5)
ALBUMIN/GLOB SERPL: 1.5 {RATIO} (ref 1–2)
ALP LIVER SERPL-CCNC: 112 U/L
ALT SERPL-CCNC: 21 U/L
ANION GAP SERPL CALC-SCNC: 4 MMOL/L (ref 0–18)
AST SERPL-CCNC: 15 U/L (ref 15–37)
BASOPHILS # BLD AUTO: 0.06 X10(3) UL (ref 0–0.2)
BASOPHILS NFR BLD AUTO: 1 %
BILIRUB SERPL-MCNC: 0.6 MG/DL (ref 0.1–2)
BILIRUB UR QL: NEGATIVE
BUN BLD-MCNC: 14 MG/DL (ref 7–18)
BUN/CREAT SERPL: 18.9 (ref 10–20)
CALCIUM BLD-MCNC: 9.5 MG/DL (ref 8.5–10.1)
CHLORIDE SERPL-SCNC: 110 MMOL/L (ref 98–112)
CLARITY UR: CLEAR
CO2 SERPL-SCNC: 30 MMOL/L (ref 21–32)
COLOR UR: YELLOW
CREAT BLD-MCNC: 0.74 MG/DL
DEPRECATED RDW RBC AUTO: 40.2 FL (ref 35.1–46.3)
EOSINOPHIL # BLD AUTO: 0.22 X10(3) UL (ref 0–0.7)
EOSINOPHIL NFR BLD AUTO: 3.7 %
ERYTHROCYTE [DISTWIDTH] IN BLOOD BY AUTOMATED COUNT: 12.9 % (ref 11–15)
GLOBULIN PLAS-MCNC: 2.7 G/DL (ref 2.8–4.4)
GLUCOSE BLD-MCNC: 92 MG/DL (ref 70–99)
GLUCOSE UR-MCNC: NEGATIVE MG/DL
HCT VFR BLD AUTO: 45.2 %
HGB BLD-MCNC: 14.2 G/DL
HGB UR QL STRIP.AUTO: NEGATIVE
IMM GRANULOCYTES # BLD AUTO: 0.01 X10(3) UL (ref 0–1)
IMM GRANULOCYTES NFR BLD: 0.2 %
KETONES UR-MCNC: NEGATIVE MG/DL
LEUKOCYTE ESTERASE UR QL STRIP.AUTO: NEGATIVE
LYMPHOCYTES # BLD AUTO: 1.39 X10(3) UL (ref 1–4)
LYMPHOCYTES NFR BLD AUTO: 23.7 %
M PROTEIN MFR SERPL ELPH: 6.7 G/DL (ref 6.4–8.2)
MCH RBC QN AUTO: 27.2 PG (ref 26–34)
MCHC RBC AUTO-ENTMCNC: 31.4 G/DL (ref 31–37)
MCV RBC AUTO: 86.4 FL
MONOCYTES # BLD AUTO: 0.5 X10(3) UL (ref 0.1–1)
MONOCYTES NFR BLD AUTO: 8.5 %
NEUTROPHILS # BLD AUTO: 3.69 X10 (3) UL (ref 1.5–7.7)
NEUTROPHILS # BLD AUTO: 3.69 X10(3) UL (ref 1.5–7.7)
NEUTROPHILS NFR BLD AUTO: 62.9 %
NITRITE UR QL STRIP.AUTO: NEGATIVE
OSMOLALITY SERPL CALC.SUM OF ELEC: 298 MOSM/KG (ref 275–295)
PATIENT FASTING Y/N/NP: YES
PH UR: 5 [PH] (ref 5–8)
PLATELET # BLD AUTO: 215 10(3)UL (ref 150–450)
POTASSIUM SERPL-SCNC: 4.6 MMOL/L (ref 3.5–5.1)
PROT UR-MCNC: NEGATIVE MG/DL
RBC # BLD AUTO: 5.23 X10(6)UL
SODIUM SERPL-SCNC: 144 MMOL/L (ref 136–145)
SP GR UR STRIP: 1.02 (ref 1–1.03)
UROBILINOGEN UR STRIP-ACNC: <2
WBC # BLD AUTO: 5.9 X10(3) UL (ref 4–11)

## 2021-02-08 PROCEDURE — 85025 COMPLETE CBC W/AUTO DIFF WBC: CPT

## 2021-02-08 PROCEDURE — 3008F BODY MASS INDEX DOCD: CPT | Performed by: INTERNAL MEDICINE

## 2021-02-08 PROCEDURE — 36415 COLL VENOUS BLD VENIPUNCTURE: CPT

## 2021-02-08 PROCEDURE — 3078F DIAST BP <80 MM HG: CPT | Performed by: INTERNAL MEDICINE

## 2021-02-08 PROCEDURE — 99214 OFFICE O/P EST MOD 30 MIN: CPT | Performed by: INTERNAL MEDICINE

## 2021-02-08 PROCEDURE — 81003 URINALYSIS AUTO W/O SCOPE: CPT | Performed by: INTERNAL MEDICINE

## 2021-02-08 PROCEDURE — 80053 COMPREHEN METABOLIC PANEL: CPT

## 2021-02-08 PROCEDURE — 3074F SYST BP LT 130 MM HG: CPT | Performed by: INTERNAL MEDICINE

## 2021-02-08 NOTE — PROGRESS NOTES
Joselin Martinez is a 78year old female.   HPI:   Patient presents with:  Abdominal Pain: pt here due to abdominal pain (feels like contractions) on and off for a while but became more painful in the last x 3 weeks       Susy Carter comes in for 2 abdomin vaccine. Current Outpatient Medications   Medication Sig Dispense Refill   • Calcium Carb-Cholecalciferol (CALCIUM 600+D3) 600-800 MG-UNIT Oral Tab Take 1 tablet by mouth daily.  60 tablet 0      Past Medical History:   Diagnosis Date   • Fragile x chromos ONLY) (CPT=74177); Future    2. Chronic LLQ pain  Symptoms have been going on for 6 months. We will get CT scan of the abdomen.  - CBC WITH DIFFERENTIAL WITH PLATELET; Future  - COMP METABOLIC PANEL (14); Future  - US ABDOMEN LIMITED (CPT=76158);  Future

## 2021-02-08 NOTE — TELEPHONE ENCOUNTER
Please let patient know that her labs look good. White count good. Chemistries good.   I see that she is scheduled for her ultrasound and CT on the 10th and 11th of this month

## 2021-02-10 ENCOUNTER — HOSPITAL ENCOUNTER (OUTPATIENT)
Dept: ULTRASOUND IMAGING | Age: 66
Discharge: HOME | End: 2021-02-10
Attending: INTERNAL MEDICINE
Payer: COMMERCIAL

## 2021-02-10 DIAGNOSIS — R10.32 CHRONIC LLQ PAIN: ICD-10-CM

## 2021-02-10 DIAGNOSIS — R10.10 PAIN OF UPPER ABDOMEN: ICD-10-CM

## 2021-02-10 DIAGNOSIS — G89.29 CHRONIC LLQ PAIN: ICD-10-CM

## 2021-02-10 PROCEDURE — 76705 ECHO EXAM OF ABDOMEN: CPT | Performed by: INTERNAL MEDICINE

## 2021-02-11 ENCOUNTER — HOSPITAL ENCOUNTER (OUTPATIENT)
Dept: CT IMAGING | Facility: HOSPITAL | Age: 66
Discharge: HOME | End: 2021-02-11
Attending: INTERNAL MEDICINE
Payer: COMMERCIAL

## 2021-02-11 DIAGNOSIS — R10.10 PAIN OF UPPER ABDOMEN: ICD-10-CM

## 2021-02-11 DIAGNOSIS — G89.29 CHRONIC LLQ PAIN: ICD-10-CM

## 2021-02-11 DIAGNOSIS — R10.32 CHRONIC LLQ PAIN: ICD-10-CM

## 2021-02-11 PROCEDURE — 74177 CT ABD & PELVIS W/CONTRAST: CPT | Performed by: INTERNAL MEDICINE

## 2021-02-12 ENCOUNTER — TELEPHONE (OUTPATIENT)
Dept: INTERNAL MEDICINE CLINIC | Facility: CLINIC | Age: 66
End: 2021-02-12

## 2021-02-12 PROBLEM — K31.4 GASTRIC DIVERTICULUM: Status: ACTIVE | Noted: 2021-02-12

## 2021-02-12 PROBLEM — K57.30 SIGMOID DIVERTICULOSIS: Status: ACTIVE | Noted: 2021-02-12

## 2021-02-12 PROBLEM — K82.4 GALLBLADDER POLYP: Status: ACTIVE | Noted: 2021-02-12

## 2021-02-12 RX ORDER — DICYCLOMINE HYDROCHLORIDE 10 MG/1
CAPSULE ORAL
Qty: 20 CAPSULE | Refills: 1 | Status: SHIPPED
Start: 2021-02-12 | End: 2021-03-17

## 2021-02-12 NOTE — TELEPHONE ENCOUNTER
Attempted to call pt x2. Left message via voicemail (ok per HIPAA). Advised pt to call back Monday with any questions or concerns.

## 2021-02-12 NOTE — TELEPHONE ENCOUNTER
Please let patient know that her ultrasound of the gallbladder showed that she has a small gallbladder polyp. Also incidental right kidney stone was mentioned.   In terms of the gallbladder polyp the radiologist recommended follow-up in 1 year just to make

## 2021-02-12 NOTE — TELEPHONE ENCOUNTER
To Dr. Renata Villarreal----    Spoke to pt and relayed MD message and instructions. Pt verbalized understanding and agrees with plan. Regarding CT, pt states she has never had her appendix removed. Also states that she hasn't reached out to Dr. Caridad Balderrama.  When at Dr. Jesus Manuel Bishop

## 2021-02-12 NOTE — TELEPHONE ENCOUNTER
Message noted. Please let patient know that if we think that her problem is more in the upper abdomen by the stomach then she can get over-the-counter Prilosec OTC. This is usually well-tolerated. She can take 1 a day.   Even if this is not a \"stomach\"

## 2021-02-15 NOTE — TELEPHONE ENCOUNTER
Please call patient let her know that I found out that the dicyclomine did not go through to her pharmacy. We can fax it today if she wishes. In outbox.

## 2021-02-18 NOTE — TELEPHONE ENCOUNTER
Spoke to patient and advised per Ariana Late patient stated she did go pick it up and is currently has it.

## 2021-09-04 ENCOUNTER — HOSPITAL ENCOUNTER (OUTPATIENT)
Age: 66
Discharge: STILL ADMITTED OR ADMINISTRATIVE | End: 2021-09-04
Payer: COMMERCIAL

## 2021-10-22 ENCOUNTER — HOSPITAL ENCOUNTER (OUTPATIENT)
Dept: MAMMOGRAPHY | Facility: HOSPITAL | Age: 66
Discharge: HOME | End: 2021-10-22
Attending: SURGERY
Payer: COMMERCIAL

## 2021-10-22 DIAGNOSIS — Z12.31 ENCOUNTER FOR SCREENING MAMMOGRAM FOR BREAST CANCER: ICD-10-CM

## 2021-10-24 DIAGNOSIS — R92.8 ABNORMAL MAMMOGRAM: Primary | ICD-10-CM

## 2021-10-28 ENCOUNTER — TELEPHONE (OUTPATIENT)
Dept: SURGERY | Facility: CLINIC | Age: 66
End: 2021-10-28

## 2021-10-28 NOTE — TELEPHONE ENCOUNTER
Attempted to call pt back regarding her mammogram order. No answer. LVM after verifying verbal release. Informed pt that the order for the mammogram was placed on 10/24 and she is good to reschedule.   Provided office number to call back or with additiona

## 2021-11-01 ENCOUNTER — OFFICE VISIT (OUTPATIENT)
Dept: INTERNAL MEDICINE CLINIC | Facility: CLINIC | Age: 66
End: 2021-11-01
Payer: COMMERCIAL

## 2021-11-01 ENCOUNTER — LAB ENCOUNTER (OUTPATIENT)
Dept: LAB | Age: 66
End: 2021-11-01
Attending: INTERNAL MEDICINE
Payer: COMMERCIAL

## 2021-11-01 VITALS
HEIGHT: 62 IN | SYSTOLIC BLOOD PRESSURE: 130 MMHG | DIASTOLIC BLOOD PRESSURE: 70 MMHG | TEMPERATURE: 98 F | HEART RATE: 68 BPM | OXYGEN SATURATION: 98 % | RESPIRATION RATE: 14 BRPM | BODY MASS INDEX: 21.35 KG/M2 | WEIGHT: 116 LBS

## 2021-11-01 DIAGNOSIS — R10.32 LLQ PAIN: ICD-10-CM

## 2021-11-01 DIAGNOSIS — E78.5 HYPERLIPIDEMIA, UNSPECIFIED HYPERLIPIDEMIA TYPE: ICD-10-CM

## 2021-11-01 DIAGNOSIS — R10.32 LLQ PAIN: Primary | ICD-10-CM

## 2021-11-01 DIAGNOSIS — G43.109 OCULAR MIGRAINE: ICD-10-CM

## 2021-11-01 DIAGNOSIS — M25.551 RIGHT HIP PAIN: ICD-10-CM

## 2021-11-01 DIAGNOSIS — K82.4 POLYP OF GALLBLADDER: ICD-10-CM

## 2021-11-01 PROCEDURE — 86235 NUCLEAR ANTIGEN ANTIBODY: CPT

## 2021-11-01 PROCEDURE — 36415 COLL VENOUS BLD VENIPUNCTURE: CPT

## 2021-11-01 PROCEDURE — 80053 COMPREHEN METABOLIC PANEL: CPT

## 2021-11-01 PROCEDURE — 3078F DIAST BP <80 MM HG: CPT | Performed by: INTERNAL MEDICINE

## 2021-11-01 PROCEDURE — 86039 ANTINUCLEAR ANTIBODIES (ANA): CPT

## 2021-11-01 PROCEDURE — 80061 LIPID PANEL: CPT

## 2021-11-01 PROCEDURE — 86038 ANTINUCLEAR ANTIBODIES: CPT

## 2021-11-01 PROCEDURE — 85025 COMPLETE CBC W/AUTO DIFF WBC: CPT

## 2021-11-01 PROCEDURE — 85652 RBC SED RATE AUTOMATED: CPT

## 2021-11-01 PROCEDURE — 82306 VITAMIN D 25 HYDROXY: CPT

## 2021-11-01 PROCEDURE — 3075F SYST BP GE 130 - 139MM HG: CPT | Performed by: INTERNAL MEDICINE

## 2021-11-01 PROCEDURE — 81003 URINALYSIS AUTO W/O SCOPE: CPT | Performed by: INTERNAL MEDICINE

## 2021-11-01 PROCEDURE — 99214 OFFICE O/P EST MOD 30 MIN: CPT | Performed by: INTERNAL MEDICINE

## 2021-11-01 PROCEDURE — 3008F BODY MASS INDEX DOCD: CPT | Performed by: INTERNAL MEDICINE

## 2021-11-01 PROCEDURE — 84443 ASSAY THYROID STIM HORMONE: CPT

## 2021-11-01 PROCEDURE — 86140 C-REACTIVE PROTEIN: CPT

## 2021-11-01 PROCEDURE — 86225 DNA ANTIBODY NATIVE: CPT

## 2021-11-01 RX ORDER — DICYCLOMINE HYDROCHLORIDE 10 MG/5ML
10 SOLUTION ORAL
COMMUNITY

## 2021-11-02 ENCOUNTER — TELEPHONE (OUTPATIENT)
Dept: INTERNAL MEDICINE CLINIC | Facility: CLINIC | Age: 66
End: 2021-11-02

## 2021-11-02 ENCOUNTER — HOSPITAL ENCOUNTER (OUTPATIENT)
Dept: GENERAL RADIOLOGY | Facility: HOSPITAL | Age: 66
Discharge: HOME | End: 2021-11-02
Attending: INTERNAL MEDICINE
Payer: COMMERCIAL

## 2021-11-02 DIAGNOSIS — R93.1 ABNORMAL ECHOCARDIOGRAM: Primary | ICD-10-CM

## 2021-11-02 DIAGNOSIS — R76.8 ELEVATED ANTINUCLEAR ANTIBODY (ANA) LEVEL: ICD-10-CM

## 2021-11-02 DIAGNOSIS — M25.551 RIGHT HIP PAIN: ICD-10-CM

## 2021-11-02 PROCEDURE — 73502 X-RAY EXAM HIP UNI 2-3 VIEWS: CPT | Performed by: INTERNAL MEDICINE

## 2021-11-02 NOTE — TELEPHONE ENCOUNTER
Discussed with [facility]. Reviewed labs. MIROSLAVA pending. I placed order for echocardiogram for inferior vena cava dilation last noted on echocardiogram August 4, 2020 last year.   She will be having her endoscopy early December

## 2021-11-05 ENCOUNTER — TELEPHONE (OUTPATIENT)
Dept: INTERNAL MEDICINE CLINIC | Facility: CLINIC | Age: 66
End: 2021-11-05

## 2021-11-05 NOTE — TELEPHONE ENCOUNTER
Please let patient know that I think her x-ray of right hip. Came out fairly satisfactory. 1.  There was only mild arthritis of the hip joints bilaterally. I would not think this is contributing to any pain.     2.  There was also mention of some mild

## 2021-11-05 NOTE — TELEPHONE ENCOUNTER
Message noted. There are still no results to become available in regards to the elevated MIROSLAVA.   To be on the safe side it would be good to consider seeing Gina Weston from rheumatology to look for any signs of inflammation and to review x-rays that were

## 2021-11-05 NOTE — TELEPHONE ENCOUNTER
Spoke with patient to relay MD message below; Patient verbalized understanding. Pt did not have any questions or concerns at this time.  She took down the name of this Rheumatologist.

## 2021-11-05 NOTE — TELEPHONE ENCOUNTER
Spoke with Chester Arias regarding other TE this morning and she inquired about MIROSLAVA results. She was wondering if you thought a Rheumatologist was still the way to go. Also wanted to note to us that she will be leaving out of town this next week.      Routed to

## 2021-11-08 ENCOUNTER — TELEPHONE (OUTPATIENT)
Dept: INTERNAL MEDICINE CLINIC | Facility: CLINIC | Age: 66
End: 2021-11-08

## 2021-11-08 NOTE — TELEPHONE ENCOUNTER
Discussed with Benny Jefferson yesterday. Noted MIROSLAVA titer 2560. We are waiting Sjogren's titers. She does have [facility] rheumatologist [facility]. She knows to make an appointment. We discussed this. Will await full work-up.

## 2021-11-11 ENCOUNTER — TELEPHONE (OUTPATIENT)
Dept: INTERNAL MEDICINE CLINIC | Facility: CLINIC | Age: 66
End: 2021-11-11

## 2021-11-11 NOTE — TELEPHONE ENCOUNTER
Discussed with Benny Jefferson. She will be seeing her rheumatologist Monday. The rest of her MIROSLAVA subcategories were normal.  As she knows her MIROSLAVA came out 2560.   She will let me know with the rheumatology consultation conclusion DISCHARGE

## 2021-11-23 ENCOUNTER — HOSPITAL ENCOUNTER (OUTPATIENT)
Dept: ULTRASOUND IMAGING | Facility: HOSPITAL | Age: 66
Discharge: HOME | End: 2021-11-23
Attending: SURGERY
Payer: COMMERCIAL

## 2021-11-23 ENCOUNTER — TELEPHONE (OUTPATIENT)
Dept: INTERNAL MEDICINE CLINIC | Facility: CLINIC | Age: 66
End: 2021-11-23

## 2021-11-23 ENCOUNTER — HOSPITAL ENCOUNTER (OUTPATIENT)
Dept: MAMMOGRAPHY | Facility: HOSPITAL | Age: 66
Discharge: HOME | End: 2021-11-23
Attending: SURGERY
Payer: COMMERCIAL

## 2021-11-23 DIAGNOSIS — R92.8 ABNORMAL MAMMOGRAM: ICD-10-CM

## 2021-11-23 PROCEDURE — 76642 ULTRASOUND BREAST LIMITED: CPT | Performed by: SURGERY

## 2021-11-23 PROCEDURE — 77062 BREAST TOMOSYNTHESIS BI: CPT | Performed by: SURGERY

## 2021-11-23 PROCEDURE — 77066 DX MAMMO INCL CAD BI: CPT | Performed by: SURGERY

## 2022-01-03 ENCOUNTER — PATIENT MESSAGE (OUTPATIENT)
Dept: INTERNAL MEDICINE CLINIC | Facility: CLINIC | Age: 67
End: 2022-01-03

## 2022-01-04 NOTE — TELEPHONE ENCOUNTER
From: Ella Galloway  Sent: 1/3/2022 2:32 PM CST  To: [facility] Clinical Staff  Subject: Follow Up    Thank you.

## 2022-01-07 ENCOUNTER — OFFICE VISIT (OUTPATIENT)
Dept: INTERNAL MEDICINE CLINIC | Facility: CLINIC | Age: 67
End: 2022-01-07
Payer: COMMERCIAL

## 2022-01-07 VITALS
SYSTOLIC BLOOD PRESSURE: 128 MMHG | BODY MASS INDEX: 21.46 KG/M2 | OXYGEN SATURATION: 100 % | DIASTOLIC BLOOD PRESSURE: 84 MMHG | WEIGHT: 116.63 LBS | HEART RATE: 84 BPM | TEMPERATURE: 98 F | HEIGHT: 62 IN

## 2022-01-07 DIAGNOSIS — Z23 FLU VACCINE NEED: ICD-10-CM

## 2022-01-07 DIAGNOSIS — K82.4 POLYP OF GALLBLADDER: ICD-10-CM

## 2022-01-07 DIAGNOSIS — Z00.00 ROUTINE HEALTH MAINTENANCE: ICD-10-CM

## 2022-01-07 DIAGNOSIS — R76.8 ELEVATED ANTINUCLEAR ANTIBODY (ANA) LEVEL: Primary | ICD-10-CM

## 2022-01-07 DIAGNOSIS — R93.1 ABNORMAL ECHOCARDIOGRAPHY: ICD-10-CM

## 2022-01-07 DIAGNOSIS — R10.32 LLQ PAIN: ICD-10-CM

## 2022-01-07 DIAGNOSIS — I49.9 IRREGULAR HEART BEATS: ICD-10-CM

## 2022-01-07 PROCEDURE — 93000 ELECTROCARDIOGRAM COMPLETE: CPT | Performed by: INTERNAL MEDICINE

## 2022-01-07 PROCEDURE — 99214 OFFICE O/P EST MOD 30 MIN: CPT | Performed by: INTERNAL MEDICINE

## 2022-01-07 PROCEDURE — 3074F SYST BP LT 130 MM HG: CPT | Performed by: INTERNAL MEDICINE

## 2022-01-07 PROCEDURE — 3008F BODY MASS INDEX DOCD: CPT | Performed by: INTERNAL MEDICINE

## 2022-01-07 PROCEDURE — 3079F DIAST BP 80-89 MM HG: CPT | Performed by: INTERNAL MEDICINE

## 2022-01-07 PROCEDURE — 90471 IMMUNIZATION ADMIN: CPT | Performed by: INTERNAL MEDICINE

## 2022-01-07 PROCEDURE — 90662 IIV NO PRSV INCREASED AG IM: CPT | Performed by: INTERNAL MEDICINE

## 2022-01-07 RX ORDER — PANTOPRAZOLE SODIUM 40 MG/1
40 TABLET, DELAYED RELEASE ORAL
COMMUNITY

## 2022-01-07 NOTE — PROGRESS NOTES
Rusty Quintana is a 78year old female. HPI:   Patient presents with:  Irregular Heart Beat: \"heart murmur\"     Noa Saba was told in regards to her colonoscopy that the doctor heard a heart murmur.   She did have an echocardiogram in the past that Osteopenia    • Vocal cord paralysis       Social History:  Social History    Tobacco Use      Smoking status: Never Smoker      Smokeless tobacco: Never Used    Vaping Use      Vaping Use: Never used    Alcohol use:  Yes      Alcohol/week: 1.0 standard dri Routine health maintenance  She will get flu vaccine today. Has had her Covid vaccination and booster    5. Flu vaccine need  Flu vaccine today.     6. Abnormal echocardiography  Repeat echocardiogram.    7. Irregular heart beats  EKG shows normal sinus rh

## 2022-01-11 ENCOUNTER — HOSPITAL ENCOUNTER (OUTPATIENT)
Dept: CV DIAGNOSTICS | Facility: HOSPITAL | Age: 67
Discharge: HOME | End: 2022-01-11
Attending: INTERNAL MEDICINE
Payer: COMMERCIAL

## 2022-01-11 ENCOUNTER — TELEPHONE (OUTPATIENT)
Dept: INTERNAL MEDICINE CLINIC | Facility: CLINIC | Age: 67
End: 2022-01-11

## 2022-01-11 DIAGNOSIS — R93.1 ABNORMAL ECHOCARDIOGRAM: ICD-10-CM

## 2022-01-11 PROCEDURE — 93306 TTE W/DOPPLER COMPLETE: CPT | Performed by: INTERNAL MEDICINE

## 2022-01-11 NOTE — TELEPHONE ENCOUNTER
Please let patient know that her echocardiogram for her heart came normal.  No valvular problems to account for the murmur heard by the other physician. I did not hear any murmur during our exam.  Therefore no follow-up for any murmurs are needed.     We d

## 2022-02-08 ENCOUNTER — HOSPITAL ENCOUNTER (OUTPATIENT)
Dept: ULTRASOUND IMAGING | Facility: HOSPITAL | Age: 67
Discharge: HOME | End: 2022-02-08
Attending: INTERNAL MEDICINE
Payer: COMMERCIAL

## 2022-02-08 ENCOUNTER — TELEPHONE (OUTPATIENT)
Dept: INTERNAL MEDICINE CLINIC | Facility: CLINIC | Age: 67
End: 2022-02-08

## 2022-02-08 DIAGNOSIS — K82.4 POLYP OF GALLBLADDER: ICD-10-CM

## 2022-02-08 PROCEDURE — 76705 ECHO EXAM OF ABDOMEN: CPT | Performed by: INTERNAL MEDICINE

## 2022-02-08 NOTE — TELEPHONE ENCOUNTER
Please let patient know that her gallbladder polyp has not changed in size per the radiologist.  It is recommended that a repeat ultrasound is done in approximately 1 year just to make sure the polyp does not increase in size and does not surgically need to be removed.   With the size it is currently at the gallbladder does not need to be removed

## 2022-02-18 ENCOUNTER — TELEPHONE (OUTPATIENT)
Dept: INTERNAL MEDICINE CLINIC | Facility: CLINIC | Age: 67
End: 2022-02-18

## 2022-07-29 ENCOUNTER — OFFICE VISIT (OUTPATIENT)
Dept: INTERNAL MEDICINE CLINIC | Facility: CLINIC | Age: 67
End: 2022-07-29
Payer: COMMERCIAL

## 2022-07-29 VITALS
BODY MASS INDEX: 20.24 KG/M2 | DIASTOLIC BLOOD PRESSURE: 76 MMHG | TEMPERATURE: 98 F | WEIGHT: 110 LBS | SYSTOLIC BLOOD PRESSURE: 120 MMHG | HEART RATE: 64 BPM | HEIGHT: 62 IN | OXYGEN SATURATION: 97 %

## 2022-07-29 DIAGNOSIS — R19.8 GI SYMPTOMS: ICD-10-CM

## 2022-07-29 DIAGNOSIS — R00.2 PALPITATIONS: Primary | ICD-10-CM

## 2022-07-29 DIAGNOSIS — Z00.00 ROUTINE HEALTH MAINTENANCE: ICD-10-CM

## 2022-07-29 DIAGNOSIS — K82.4 POLYP OF GALLBLADDER: ICD-10-CM

## 2022-07-29 DIAGNOSIS — R63.4 WEIGHT LOSS: ICD-10-CM

## 2022-07-29 DIAGNOSIS — R76.8 ELEVATED ANTINUCLEAR ANTIBODY (ANA) LEVEL: ICD-10-CM

## 2022-07-29 PROCEDURE — 3008F BODY MASS INDEX DOCD: CPT | Performed by: INTERNAL MEDICINE

## 2022-07-29 PROCEDURE — 3078F DIAST BP <80 MM HG: CPT | Performed by: INTERNAL MEDICINE

## 2022-07-29 PROCEDURE — 3074F SYST BP LT 130 MM HG: CPT | Performed by: INTERNAL MEDICINE

## 2022-07-29 PROCEDURE — 99215 OFFICE O/P EST HI 40 MIN: CPT | Performed by: INTERNAL MEDICINE

## 2022-07-29 PROCEDURE — 93000 ELECTROCARDIOGRAM COMPLETE: CPT | Performed by: INTERNAL MEDICINE

## 2022-08-08 ENCOUNTER — HOSPITAL ENCOUNTER (OUTPATIENT)
Dept: CV DIAGNOSTICS | Facility: HOSPITAL | Age: 67
Discharge: HOME | End: 2022-08-08
Attending: INTERNAL MEDICINE
Payer: COMMERCIAL

## 2022-08-08 DIAGNOSIS — R00.2 PALPITATIONS: ICD-10-CM

## 2022-08-08 PROCEDURE — 93247 EXT ECG>7D<15D SCAN A/R: CPT | Performed by: INTERNAL MEDICINE

## 2022-08-08 PROCEDURE — 93246 EXT ECG>7D<15D RECORDING: CPT | Performed by: INTERNAL MEDICINE

## 2022-08-22 ENCOUNTER — HOSPITAL ENCOUNTER (OUTPATIENT)
Dept: GENERAL RADIOLOGY | Facility: HOSPITAL | Age: 67
Discharge: HOME | End: 2022-08-22
Attending: INTERNAL MEDICINE
Payer: COMMERCIAL

## 2022-08-22 DIAGNOSIS — R00.2 PALPITATIONS: ICD-10-CM

## 2022-08-22 PROCEDURE — 71046 X-RAY EXAM CHEST 2 VIEWS: CPT | Performed by: INTERNAL MEDICINE

## 2022-08-23 ENCOUNTER — TELEPHONE (OUTPATIENT)
Dept: INTERNAL MEDICINE CLINIC | Facility: CLINIC | Age: 67
End: 2022-08-23

## 2022-08-23 NOTE — TELEPHONE ENCOUNTER
Please let patient know that her chest x-ray came out good. I did find a communication that her ZIO heart monitor was turned in but I never got results. Please ask her if she wore it for several days and then turned it in.   I can then follow-up and find results

## 2022-08-24 NOTE — TELEPHONE ENCOUNTER
Relayed MD message to pt, who verbalized understanding. Wore it for 14 days and mailed it in yesterday they should receive it tonight.

## 2022-08-28 ENCOUNTER — TELEPHONE (OUTPATIENT)
Dept: INTERNAL MEDICINE CLINIC | Facility: CLINIC | Age: 67
End: 2022-08-28

## 2022-08-28 DIAGNOSIS — R00.2 PALPITATIONS: Primary | ICD-10-CM

## 2022-08-28 DIAGNOSIS — R00.0 RAPID HEART BEAT: ICD-10-CM

## 2022-08-28 NOTE — TELEPHONE ENCOUNTER
Discussed with [facility]. Reviewed 14-day monitor. She does have a wide range of heart rates. Some atrial runs. Thankfully no atrial fibrillation. No SVT that seems to be mentioned. I have asked her to see Dr. Ry Busch. She agrees. She gets palpitations mostly at night on and off.

## 2022-11-04 ENCOUNTER — PATIENT MESSAGE (OUTPATIENT)
Dept: INTERNAL MEDICINE CLINIC | Facility: CLINIC | Age: 67
End: 2022-11-04

## 2022-11-04 DIAGNOSIS — Z12.31 ENCOUNTER FOR SCREENING MAMMOGRAM FOR MALIGNANT NEOPLASM OF BREAST: Primary | ICD-10-CM

## 2022-11-04 NOTE — TELEPHONE ENCOUNTER
From: Leidy Dangelo  To: Seven Urbina MD  Sent: 11/4/2022 3:07 PM CDT  Subject: Paradise Gilbert,    I was notified that I am due for a mammogram later this month. Would you please submit the order?     Thanks,  Monet Muniz

## 2022-12-09 ENCOUNTER — HOSPITAL ENCOUNTER (OUTPATIENT)
Dept: MAMMOGRAPHY | Facility: HOSPITAL | Age: 67
Discharge: HOME | End: 2022-12-09
Attending: INTERNAL MEDICINE
Payer: COMMERCIAL

## 2022-12-09 DIAGNOSIS — Z12.31 ENCOUNTER FOR SCREENING MAMMOGRAM FOR MALIGNANT NEOPLASM OF BREAST: ICD-10-CM

## 2022-12-09 PROCEDURE — 77067 SCR MAMMO BI INCL CAD: CPT | Performed by: INTERNAL MEDICINE

## 2022-12-09 PROCEDURE — 77063 BREAST TOMOSYNTHESIS BI: CPT | Performed by: INTERNAL MEDICINE

## 2022-12-12 ENCOUNTER — TELEPHONE (OUTPATIENT)
Dept: INTERNAL MEDICINE CLINIC | Facility: CLINIC | Age: 67
End: 2022-12-12

## 2022-12-12 NOTE — TELEPHONE ENCOUNTER
Please let patient know that her mammogram report was benign. The radiologist did report some stable areas in the right lower breast area that was noted on prior mammograms. Please advise her to follow-up with Flores Luna whom she has seen before. Phone number [phone]. I placed referral.  I placed copy of mammogram report in outbox that we can mail to her. ( Hi Dr. Ally Leger. I asked Nia Holly to follow-up with you for her breast exam and review of mammogram findings . Thank you.  Estee Welch )

## 2022-12-13 NOTE — TELEPHONE ENCOUNTER
Spoke to patient and relayed MD message pt verbalized understanding and agree's to plan will call and follow up with Flores Luna

## 2023-02-20 ENCOUNTER — OFFICE VISIT (OUTPATIENT)
Dept: SURGERY | Facility: CLINIC | Age: 68
End: 2023-02-20
Payer: COMMERCIAL

## 2023-02-20 VITALS
OXYGEN SATURATION: 97 % | TEMPERATURE: 98 F | HEART RATE: 61 BPM | SYSTOLIC BLOOD PRESSURE: 134 MMHG | BODY MASS INDEX: 21 KG/M2 | RESPIRATION RATE: 16 BRPM | DIASTOLIC BLOOD PRESSURE: 67 MMHG | WEIGHT: 117.19 LBS

## 2023-02-20 DIAGNOSIS — Z12.31 SCREENING MAMMOGRAM FOR BREAST CANCER: ICD-10-CM

## 2023-02-20 DIAGNOSIS — R92.2 DENSE BREAST TISSUE: Primary | ICD-10-CM

## 2023-03-27 ENCOUNTER — OFFICE VISIT (OUTPATIENT)
Dept: INTERNAL MEDICINE CLINIC | Facility: CLINIC | Age: 68
End: 2023-03-27

## 2023-03-27 VITALS
RESPIRATION RATE: 16 BRPM | WEIGHT: 115 LBS | HEIGHT: 62 IN | OXYGEN SATURATION: 99 % | DIASTOLIC BLOOD PRESSURE: 68 MMHG | SYSTOLIC BLOOD PRESSURE: 124 MMHG | BODY MASS INDEX: 21.16 KG/M2 | TEMPERATURE: 97 F | HEART RATE: 60 BPM

## 2023-03-27 DIAGNOSIS — E55.9 VITAMIN D DEFICIENCY: ICD-10-CM

## 2023-03-27 DIAGNOSIS — R76.8 ELEVATED ANTINUCLEAR ANTIBODY (ANA) LEVEL: ICD-10-CM

## 2023-03-27 DIAGNOSIS — Z00.00 ANNUAL PHYSICAL EXAM: Primary | ICD-10-CM

## 2023-03-27 DIAGNOSIS — E53.8 VITAMIN B 12 DEFICIENCY: ICD-10-CM

## 2023-03-27 DIAGNOSIS — R63.4 WEIGHT LOSS: ICD-10-CM

## 2023-03-27 DIAGNOSIS — K30 ACID INDIGESTION: ICD-10-CM

## 2023-03-27 DIAGNOSIS — Z00.00 ROUTINE HEALTH MAINTENANCE: ICD-10-CM

## 2023-03-27 DIAGNOSIS — K82.4 POLYP OF GALLBLADDER: ICD-10-CM

## 2023-03-27 DIAGNOSIS — R00.2 PALPITATIONS: ICD-10-CM

## 2023-03-27 PROCEDURE — 3074F SYST BP LT 130 MM HG: CPT | Performed by: INTERNAL MEDICINE

## 2023-03-27 PROCEDURE — 99397 PER PM REEVAL EST PAT 65+ YR: CPT | Performed by: INTERNAL MEDICINE

## 2023-03-27 PROCEDURE — 3008F BODY MASS INDEX DOCD: CPT | Performed by: INTERNAL MEDICINE

## 2023-03-27 PROCEDURE — 3078F DIAST BP <80 MM HG: CPT | Performed by: INTERNAL MEDICINE

## 2023-03-27 RX ORDER — METOPROLOL SUCCINATE 25 MG/1
TABLET, EXTENDED RELEASE ORAL
COMMUNITY
Start: 2022-09-09

## 2023-03-28 ENCOUNTER — LAB ENCOUNTER (OUTPATIENT)
Dept: LAB | Age: 68
End: 2023-03-28
Attending: INTERNAL MEDICINE
Payer: COMMERCIAL

## 2023-03-28 DIAGNOSIS — E55.9 VITAMIN D DEFICIENCY: ICD-10-CM

## 2023-03-28 DIAGNOSIS — Z00.00 ANNUAL PHYSICAL EXAM: ICD-10-CM

## 2023-03-28 DIAGNOSIS — E53.8 VITAMIN B 12 DEFICIENCY: ICD-10-CM

## 2023-03-28 LAB
ALBUMIN SERPL-MCNC: 3.7 G/DL (ref 3.4–5)
ALBUMIN/GLOB SERPL: 1.3 {RATIO} (ref 1–2)
ALP LIVER SERPL-CCNC: 102 U/L
ALT SERPL-CCNC: 23 U/L
ANION GAP SERPL CALC-SCNC: 4 MMOL/L (ref 0–18)
AST SERPL-CCNC: 16 U/L (ref 15–37)
BASOPHILS # BLD AUTO: 0.06 X10(3) UL (ref 0–0.2)
BASOPHILS NFR BLD AUTO: 1.2 %
BILIRUB SERPL-MCNC: 0.8 MG/DL (ref 0.1–2)
BILIRUB UR QL: NEGATIVE
BUN BLD-MCNC: 15 MG/DL (ref 7–18)
BUN/CREAT SERPL: 19.5 (ref 10–20)
CALCIUM BLD-MCNC: 9 MG/DL (ref 8.5–10.1)
CHLORIDE SERPL-SCNC: 108 MMOL/L (ref 98–112)
CHOLEST SERPL-MCNC: 187 MG/DL (ref ?–200)
CLARITY UR: CLEAR
CO2 SERPL-SCNC: 32 MMOL/L (ref 21–32)
COLOR UR: COLORLESS
CREAT BLD-MCNC: 0.77 MG/DL
DEPRECATED RDW RBC AUTO: 40.2 FL (ref 35.1–46.3)
EOSINOPHIL # BLD AUTO: 0.15 X10(3) UL (ref 0–0.7)
EOSINOPHIL NFR BLD AUTO: 3.1 %
ERYTHROCYTE [DISTWIDTH] IN BLOOD BY AUTOMATED COUNT: 12.9 % (ref 11–15)
FASTING PATIENT LIPID ANSWER: YES
FASTING STATUS PATIENT QL REPORTED: YES
GFR SERPLBLD BASED ON 1.73 SQ M-ARVRAT: 83 ML/MIN/1.73M2 (ref 60–?)
GLOBULIN PLAS-MCNC: 2.8 G/DL (ref 2.8–4.4)
GLUCOSE BLD-MCNC: 89 MG/DL (ref 70–99)
GLUCOSE UR-MCNC: NORMAL MG/DL
HCT VFR BLD AUTO: 43.6 %
HDLC SERPL-MCNC: 75 MG/DL (ref 40–59)
HGB BLD-MCNC: 14.1 G/DL
HGB UR QL STRIP.AUTO: NEGATIVE
IMM GRANULOCYTES # BLD AUTO: 0.01 X10(3) UL (ref 0–1)
IMM GRANULOCYTES NFR BLD: 0.2 %
KETONES UR-MCNC: NEGATIVE MG/DL
LDLC SERPL CALC-MCNC: 98 MG/DL (ref ?–100)
LEUKOCYTE ESTERASE UR QL STRIP.AUTO: NEGATIVE
LYMPHOCYTES # BLD AUTO: 1.51 X10(3) UL (ref 1–4)
LYMPHOCYTES NFR BLD AUTO: 31.3 %
MCH RBC QN AUTO: 27.9 PG (ref 26–34)
MCHC RBC AUTO-ENTMCNC: 32.3 G/DL (ref 31–37)
MCV RBC AUTO: 86.2 FL
MONOCYTES # BLD AUTO: 0.42 X10(3) UL (ref 0.1–1)
MONOCYTES NFR BLD AUTO: 8.7 %
NEUTROPHILS # BLD AUTO: 2.68 X10 (3) UL (ref 1.5–7.7)
NEUTROPHILS # BLD AUTO: 2.68 X10(3) UL (ref 1.5–7.7)
NEUTROPHILS NFR BLD AUTO: 55.5 %
NITRITE UR QL STRIP.AUTO: NEGATIVE
NONHDLC SERPL-MCNC: 112 MG/DL (ref ?–130)
OSMOLALITY SERPL CALC.SUM OF ELEC: 298 MOSM/KG (ref 275–295)
PH UR: 6 [PH] (ref 5–8)
PLATELET # BLD AUTO: 214 10(3)UL (ref 150–450)
POTASSIUM SERPL-SCNC: 4 MMOL/L (ref 3.5–5.1)
PROT SERPL-MCNC: 6.5 G/DL (ref 6.4–8.2)
PROT UR-MCNC: NEGATIVE MG/DL
RBC # BLD AUTO: 5.06 X10(6)UL
SODIUM SERPL-SCNC: 144 MMOL/L (ref 136–145)
SP GR UR STRIP: 1.01 (ref 1–1.03)
TRIGL SERPL-MCNC: 76 MG/DL (ref 30–149)
TSI SER-ACNC: 2.31 MIU/ML (ref 0.36–3.74)
UROBILINOGEN UR STRIP-ACNC: NORMAL
VIT B12 SERPL-MCNC: 271 PG/ML (ref 193–986)
VIT D+METAB SERPL-MCNC: 23.5 NG/ML (ref 30–100)
VLDLC SERPL CALC-MCNC: 12 MG/DL (ref 0–30)
WBC # BLD AUTO: 4.8 X10(3) UL (ref 4–11)

## 2023-03-28 PROCEDURE — 84443 ASSAY THYROID STIM HORMONE: CPT

## 2023-03-28 PROCEDURE — 36415 COLL VENOUS BLD VENIPUNCTURE: CPT

## 2023-03-28 PROCEDURE — 85025 COMPLETE CBC W/AUTO DIFF WBC: CPT

## 2023-03-28 PROCEDURE — 80053 COMPREHEN METABOLIC PANEL: CPT

## 2023-03-28 PROCEDURE — 80061 LIPID PANEL: CPT

## 2023-03-28 PROCEDURE — 82306 VITAMIN D 25 HYDROXY: CPT

## 2023-03-28 PROCEDURE — 82607 VITAMIN B-12: CPT

## 2023-03-28 PROCEDURE — 83921 ORGANIC ACID SINGLE QUANT: CPT

## 2023-03-30 ENCOUNTER — TELEPHONE (OUTPATIENT)
Dept: INTERNAL MEDICINE CLINIC | Facility: CLINIC | Age: 68
End: 2023-03-30

## 2023-03-30 NOTE — TELEPHONE ENCOUNTER
Spoke with patient relayed physician message below. Patient verbalized understanding. Patient states she had stopped taking vitamin D, but will resume with the calcium supplement.

## 2023-03-30 NOTE — TELEPHONE ENCOUNTER
Please let patient know that I thought her test results came out fairly satisfactory. 1.  Her vitamin B12 is in the low normal range and there is some extra testing that is being done to see if she would benefit from vitamin B12 supplement    2. Her vitamin D was just a little bit low at 23. We like it to be greater than 30. Please ask if she is taking any vitamin D. If she is already taking vitamin D 1000 units a day I would increase it up to 2000 units a day. If she is not taking any vitamin D then I would ask her to take 1000 units of vitamin D a day. Also a calcium supplement of 500 mg or so is also helpful to help keep her bones strong. I think she keeps up with her DEXA scan's with her rheumatologist.    3.  Other than the above her blood count looks good. Chemistry is good.   Cholesterol good and thyroid good

## 2023-04-01 ENCOUNTER — TELEPHONE (OUTPATIENT)
Dept: INTERNAL MEDICINE CLINIC | Facility: CLINIC | Age: 68
End: 2023-04-01

## 2023-04-01 LAB — MMA: 0.47 UMOL/L

## 2023-04-01 NOTE — TELEPHONE ENCOUNTER
Please let patient know that 1 extra test that I was waiting on that checks if people can benefit from a vitamin B12 supplement came out that she would benefit from vitamin B12 supplement. She can get over-the-counter vitamin B12 supplement 1000 mcg a day and take 1 a day.

## 2023-04-27 ENCOUNTER — HOSPITAL ENCOUNTER (OUTPATIENT)
Dept: ULTRASOUND IMAGING | Facility: HOSPITAL | Age: 68
Discharge: HOME | End: 2023-04-27
Payer: COMMERCIAL

## 2023-04-27 DIAGNOSIS — R92.2 DENSE BREAST TISSUE: ICD-10-CM

## 2023-04-27 PROCEDURE — 76641 ULTRASOUND BREAST COMPLETE: CPT

## 2023-05-10 ENCOUNTER — TELEPHONE (OUTPATIENT)
Dept: INTERNAL MEDICINE CLINIC | Facility: CLINIC | Age: 68
End: 2023-05-10

## 2023-05-10 ENCOUNTER — HOSPITAL ENCOUNTER (OUTPATIENT)
Dept: ULTRASOUND IMAGING | Age: 68
Discharge: HOME | End: 2023-05-10
Attending: INTERNAL MEDICINE
Payer: COMMERCIAL

## 2023-05-10 DIAGNOSIS — K82.4 POLYP OF GALLBLADDER: ICD-10-CM

## 2023-05-10 PROCEDURE — 76705 ECHO EXAM OF ABDOMEN: CPT | Performed by: INTERNAL MEDICINE

## 2023-05-10 NOTE — TELEPHONE ENCOUNTER
Please let patient know that her gallbladder test came out good. A very tiny polyp was seen. For now no treatment needed.

## 2023-07-06 ENCOUNTER — HOSPITAL ENCOUNTER (OUTPATIENT)
Dept: GENERAL RADIOLOGY | Facility: HOSPITAL | Age: 68
Discharge: HOME | End: 2023-07-06
Attending: INTERNAL MEDICINE
Payer: COMMERCIAL

## 2023-07-06 ENCOUNTER — OFFICE VISIT (OUTPATIENT)
Dept: INTERNAL MEDICINE CLINIC | Facility: CLINIC | Age: 68
End: 2023-07-06

## 2023-07-06 VITALS
SYSTOLIC BLOOD PRESSURE: 126 MMHG | OXYGEN SATURATION: 98 % | DIASTOLIC BLOOD PRESSURE: 68 MMHG | HEART RATE: 64 BPM | HEIGHT: 62 IN | WEIGHT: 118 LBS | TEMPERATURE: 98 F | BODY MASS INDEX: 21.71 KG/M2

## 2023-07-06 DIAGNOSIS — M25.562 ACUTE PAIN OF LEFT KNEE: Primary | ICD-10-CM

## 2023-07-06 DIAGNOSIS — M25.562 ACUTE PAIN OF LEFT KNEE: ICD-10-CM

## 2023-07-06 PROCEDURE — 3078F DIAST BP <80 MM HG: CPT | Performed by: INTERNAL MEDICINE

## 2023-07-06 PROCEDURE — 73560 X-RAY EXAM OF KNEE 1 OR 2: CPT | Performed by: INTERNAL MEDICINE

## 2023-07-06 PROCEDURE — 99214 OFFICE O/P EST MOD 30 MIN: CPT | Performed by: INTERNAL MEDICINE

## 2023-07-06 PROCEDURE — 3074F SYST BP LT 130 MM HG: CPT | Performed by: INTERNAL MEDICINE

## 2023-07-06 PROCEDURE — 3008F BODY MASS INDEX DOCD: CPT | Performed by: INTERNAL MEDICINE

## 2023-07-07 ENCOUNTER — TELEPHONE (OUTPATIENT)
Dept: INTERNAL MEDICINE CLINIC | Facility: CLINIC | Age: 68
End: 2023-07-07

## 2023-07-07 NOTE — TELEPHONE ENCOUNTER
Please let patient know that her left knee x-ray showed just \"mild\" arthritis but without any other major issues. There was also some calcium deposition that may contribute to her discomfort. As per yesterday I thought it would be eastman for her to follow-up with orthopedics.   I did give her contact information to Dr. Tavo Foley

## 2023-07-07 NOTE — TELEPHONE ENCOUNTER
Called patient relaying Jonah Segura MD xray result message below. Yohana Mccurdy states continues to have some swelling and stiffness to the let knee but is tolerated well. She has appointment with Dr. Carina Buckner on 07/26/23 also on waiting list as well if sooner appointment is available.

## 2023-12-14 ENCOUNTER — HOSPITAL ENCOUNTER (OUTPATIENT)
Dept: MAMMOGRAPHY | Facility: HOSPITAL | Age: 68
Discharge: HOME | End: 2023-12-14
Payer: COMMERCIAL

## 2023-12-14 DIAGNOSIS — Z12.31 SCREENING MAMMOGRAM FOR BREAST CANCER: ICD-10-CM

## 2023-12-14 PROCEDURE — 77063 BREAST TOMOSYNTHESIS BI: CPT

## 2023-12-14 PROCEDURE — 77067 SCR MAMMO BI INCL CAD: CPT

## 2024-02-27 NOTE — TELEPHONE ENCOUNTER
Called patient and relayed Dr Marte Apt message to her. She verbalized understanding. She reports she had the reaction to penicillin as a child, she had a rash. Updated patient's allergies.     TAYEI to Dr Modesto Walter
Noted.
Please call patient. Please let her know her chest x-ray was good. No pneumonia. I have called in a prescription for the same antibiotic as her  got today. I sent over a Zithromax [id number] since patient has allergy to penicillin on her file.   Vivian
Detail Level: Zone

## 2024-02-28 ENCOUNTER — OFFICE VISIT (OUTPATIENT)
Dept: SURGERY | Facility: CLINIC | Age: 69
End: 2024-02-28
Payer: COMMERCIAL

## 2024-02-28 VITALS
RESPIRATION RATE: 19 BRPM | BODY MASS INDEX: 22.08 KG/M2 | TEMPERATURE: 98 F | WEIGHT: 120 LBS | HEIGHT: 62 IN | OXYGEN SATURATION: 96 % | DIASTOLIC BLOOD PRESSURE: 55 MMHG | SYSTOLIC BLOOD PRESSURE: 108 MMHG | HEART RATE: 57 BPM

## 2024-02-28 DIAGNOSIS — R92.30 DENSE BREASTS: Primary | ICD-10-CM

## 2024-02-28 DIAGNOSIS — Z12.31 SCREENING MAMMOGRAM FOR BREAST CANCER: ICD-10-CM

## 2024-02-28 PROCEDURE — 99203 OFFICE O/P NEW LOW 30 MIN: CPT | Performed by: SURGERY

## 2024-02-28 NOTE — PROGRESS NOTES
Breast Surgery Surveillance Visit    History of Present Illness:   Ms. Annette Hollingsworth is a 69 year old woman who presents with a prior abnormal mammogram.  She acutely denies any palpable masses, nipple discharge, skin changes or axillary symptoms.  She has no prior history of breast disease or biopsies.  She has no known family history of breast cancer.  She underwent a 3D screening mammogram on 2019 and was noted to have heterogeneously dense breast with an indeterminate asymmetry in the right breast for which additional imaging was recommended.  A right breast diagnostic evaluation was performed on 2019 and confirmed a normal-appearing lymph node at 1:00 measuring 8 mm as well as a 5 mm nodular density in the posterior inner right breast thought to be benign.  Since her last visit she has had no new concerns.  She had a bilateral breast screening mammogram in 2023 which showed stable findings.  Will complete screening ultrasound due to density in 2023 was unremarkable.  She is here today for evaluation and recommendations for further therapy.        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    She has cumulative breastfeeding history of 25 months, last unknown.  She achieved menarche at age 11  Age of Menopause: 52  Type: Natural  She denies any history of hormone replacement therapy.  She has history of oral contraceptive use for unknown years, last unknown years ago.  She denies infertility treatment to achieve pregnancy.    Medications:    No outpatient medications have been marked as taking for the 24 encounter  (Appointment) with Promise Grey MD.       Allergies:    Allergies   Allergen Reactions    Penicillin G UNKNOWN    Penicillins RASH     Rash when young child       Family History:   Family History   Problem Relation Age of Onset    Alcohol and Other Disorders Associated Father         cirrhosis from etoh    Cancer Mother 59        lung    Other (Other) Daughter         Autoimmune, platelet problem. Daughter is fragile X carrier    Diabetes Other         family h/o DM    Cancer Paternal Grandfather 70        lung       She is not of Ashkenazi Judaism ancestry.    Social History:  History   Alcohol Use    1.0 standard drink of alcohol/week    1 Standard drinks or equivalent per week     Comment: beer & wine 1-2 drink on weekends       History   Smoking Status    Never   Smokeless Tobacco    Never   The patient is .  She has 3 children.  She is employed part-time.    Review of Systems:  General:   The patient denies, fever, chills, night sweats, fatigue, generalized weakness, change in appetite or weight loss.    HEENT:     The patient denies eye irritation, cataracts, redness, glaucoma, yellowing of the eyes, change in vision or color blindness. The patient denies hearing loss, ringing in the ears, ear drainage, earaches, nasal congestion, nose bleeds, snoring, pain in mouth/throat, hoarseness, change in voice, facial trauma.    Respiratory:  The patient denies chronic cough, phlegm, hemoptysis, pleurisy/chest pain, pneumonia, asthma, wheezing, difficulty in breathing with exertion, emphysema, chronic bronchitis, shortness of breath or abnormal sound when breathing.     Cardiovascular:  There is no history of chest pain, chest pressure/discomfort, palpitations, irregular heartbeat, fainting or near-fainting, difficulty breathing when lying flat, SOB/Coughing at night, swelling of the legs or chest pain while walking.    Breasts:  See history of present illness    Gastrointestinal:     There is no history of  difficulty or pain with swallowing, reflux symptoms, vomiting, dark or bloody stools, constipation, yellowing of the skin, indigestion, nausea, change in bowel habits, diarrhea, abdominal pain or vomiting blood.     Genitourinary:  The patient denies frequent urination, needing to get up at night to urinate, urinary hesitancy or retaining urine, painful urination, urinary incontinence, decreased urine stream, blood in the urine or vaginal/penile discharge.    Skin:    The patient denies rash, itching, skin lesions, dry skin, change in skin color or change in moles.     Hematologic/Lymphatic:  The patient denies easily bruising or bleeding or persistent swollen glands or lymph nodes.     Musculoskeletal:  The patient denies muscle aches/pain, joint pain, stiff joints, neck pain, back pain or bone pain.    Neuropsychiatric:  There is no history of migraines or severe headaches, seizure/epilepsy, speech problems, coordination problems, trembling/tremors, fainting/black outs, dizziness, memory problems, loss of sensation/numbness, problems walking, weakness, tingling or burning in hands/feet. There is no history of abusive relationship, bipolar disorder, sleep disturbance, anxiety, depression or feeling of despair.    Endocrine:    There is no history of poor/slow wound healing, weight loss/gain, fertility or hormone problems, cold intolerance, thyroid disease.     Allergic/Immunologic:  There is no history of hives, hay fever, angioedema or anaphylaxis.    /55 (BP Location: Left arm, Patient Position: Sitting, Cuff Size: adult)   Pulse 57   Temp 97.6 °F (36.4 °C) (Temporal)   Resp 19   Ht 1.575 m (5' 2\")   Wt 54.4 kg (120 lb)   SpO2 96%   BMI 21.95 kg/m²     Physical Exam:  The patient is an alert, oriented, well-nourished and  well-developed woman who appears her stated age. Her speech patterns and movements are normal. Her affect is appropriate.    HEENT: The head is normocephalic. The neck is supple.  The thyroid is not enlarged and is without palpable masses/nodules. There are no palpable masses. The trachea is in the midline. Conjunctiva are clear, non-icteric.    Chest: The chest expands symmetrically. The lungs are clear to auscultation.    Heart: The rhythm is regular.  There are no murmurs, rubs, gallops or thrills.    Breasts:  Her breasts are symmetrical with a cup size B.  Right breast: The skin, nipple ,and areola appear normal. There is no skin dimpling with movement of the pectoralis. There is no nipple retraction. No nipple discharge can be elicited. The parenchyma is mildly nodular. There are no dominant masses in the breast. The axillary tail is normal.  Left breast:   The skin, nipple, and areola appear normal. There is no skin dimpling with movement of the pectoralis. There is no nipple retraction. No nipple discharge can be elicited. The parenchyma is mildly nodular. There are no dominant masses in the breast. The axillary tail is normal.    Abdomen:  The abdomen is soft, flat and non tender. The liver is not enlarged. There are no palpable masses.    Lymph Nodes:  The supraclavicular, axillary and cervical regions are free of significant lymphadenopathy.    Back: There is no vertebral column tenderness.    Skin: The skin appears normal. There are no suspicious appearing rashes or lesions.    Extremities: The extremities are without deformity, cyanosis or edema.    Impression:   Ms. Annette Hollingsworth is a 69 year old woman presents with dense breasts and stable benign-appearing right breast masses.    Discussion and Plan:  I had a discussion with the Patient regarding her breast exam. On exam today I found no clinical evidence of malignancy bilaterally.  I reviewed her recent imaging we discussed this at length.  She did have asymmetry detected in her right breast which has remained stable.  We discussed that there are lesions in this area to account for the findings that are benign and  stable in size and therefore warrant no further intervention.  She will need her annual mammogram in December and 24.  Due to density she qualifies for annual supplemental complete breast ultrasound and we will plan to stagger this with her mammogram in April 2024.  She will require clinical breast exam annually.  She was given ample opportunity for questions and those questions were answered to her satisfaction. She has been encouraged to contact the office with any questions or concerns as needed related to her breast health.    This encounter lasted a total of 25 minutes, more than 50% of which was dedicated to the discussion of management options.

## 2024-02-29 PROBLEM — R92.30 DENSE BREASTS: Status: ACTIVE | Noted: 2024-02-29

## 2024-03-28 ENCOUNTER — OFFICE VISIT (OUTPATIENT)
Dept: INTERNAL MEDICINE CLINIC | Facility: CLINIC | Age: 69
End: 2024-03-28

## 2024-03-28 VITALS
DIASTOLIC BLOOD PRESSURE: 72 MMHG | TEMPERATURE: 98 F | OXYGEN SATURATION: 99 % | HEIGHT: 62 IN | WEIGHT: 118.81 LBS | BODY MASS INDEX: 21.86 KG/M2 | HEART RATE: 65 BPM | SYSTOLIC BLOOD PRESSURE: 118 MMHG

## 2024-03-28 DIAGNOSIS — Z00.00 ROUTINE HEALTH MAINTENANCE: ICD-10-CM

## 2024-03-28 DIAGNOSIS — R63.4 WEIGHT LOSS: ICD-10-CM

## 2024-03-28 DIAGNOSIS — K82.4 POLYP OF GALLBLADDER: ICD-10-CM

## 2024-03-28 DIAGNOSIS — Z00.00 ANNUAL PHYSICAL EXAM: Primary | ICD-10-CM

## 2024-03-28 DIAGNOSIS — E55.9 VITAMIN D DEFICIENCY: ICD-10-CM

## 2024-03-28 DIAGNOSIS — R76.8 POSITIVE ANA (ANTINUCLEAR ANTIBODY): ICD-10-CM

## 2024-03-28 DIAGNOSIS — M19.079 ARTHRITIS OF FIRST MTP JOINT: ICD-10-CM

## 2024-03-28 DIAGNOSIS — E53.8 VITAMIN B 12 DEFICIENCY: ICD-10-CM

## 2024-03-28 DIAGNOSIS — R76.8 ELEVATED ANTINUCLEAR ANTIBODY (ANA) LEVEL: ICD-10-CM

## 2024-03-28 DIAGNOSIS — R00.0 TACHYCARDIA: ICD-10-CM

## 2024-03-28 PROCEDURE — 99397 PER PM REEVAL EST PAT 65+ YR: CPT | Performed by: INTERNAL MEDICINE

## 2024-03-28 NOTE — PROGRESS NOTES
Annette Hollingsworth is a 69 year old female.  HPI:     Chief Complaint   Patient presents with    Physical     Annual Px      Annette comes for annual physical.    She has intermittent abdominal discomfort and GERD symptoms.  She may get these for about 2 weeks ago and then she will be asymptomatic for a while.  She will be seeing a new gastroenterologist .  Her prior gastroenterologist is no longer available.  I did give her to show her new gastroenterologist in office visit note by TUYET Desai for gastroenterology July 17, 2023.  This summarizes her past GI history.  I also gave her pathology reports.    It was noted she has had a prior CT scan of the abdomen showing no CT evidence of acute intra-abdominal process.  Mild sigmoid colonic diverticulosis without diverticulitis.  Gastric diverticulum at the posterior fundus.    Prior ultrasound of the gallbladder showed 4 mm gallbladder polyp.  I have given order for updated gallbladder ultrasound of this May.    A upper endoscopy December 7, 2021 showed normal oropharynx.  Normal upper third of esophagus and middle third of esophagus.  Z-line irregular.  LA grade 1 esophagitis.  Esophageal mucosal variant.  Gastric diverticulum.  Erythematous mucosa in the antrum.  A few gastric polyps.  Final diagnosis showed duodenal biopsy showing duodenal mucosa with patchy borderline intraepithelial lymphocytosis and preserved villous architecture.  Gastric biopsy showed mild reactive changes.  No evidence of H. pylori.    A cardia biopsy showed multilayer epithelium which may be an incipient stage of Rinaldi's metaplasia.  Negative for dysplasia.    Distal esophageal biopsy showed mild inactive chronic nonspecific esophagitis.    Under duodenal biopsy was a comment showing duodenal mucosa and colorectal mucosa with intraepithelial lymphocytosis.  The differential diagnosis includes celiac disease, idiopathic lymphocytic colitis, collagenous colitis.  Drug  associated colitis.  Included in the differential diagnosis was viral infection, postinfectious colitis and autoimmune.    A colonoscopy was done December 7, 2021 showed one 8 mm polyp.  One 6 mm polyp.  One 4 mm polyp.  The cecal polyp was a tubular adenoma.  Ascending colon polyp was tubular adenoma.    Rectal mucosa with intraepithelial lymphocytosis.    I have given her the pathology reports to show to her new gastroenterologist.  She will let me know after she sees him what his thoughts are.    Her weight now is stable.  She indicates that she was down to 108 pounds.    She indicates that a couple days ago she had a heart rate of 180.  She felt well at that time.  I did ask her about this.  I did tell her this was very elevated.  I asked her to call Dr. Severino her cardiologist regarding this.  She may need a revisit with a heart monitor.  She seen Dr. Severino in the past    She has seen rheumatology in the past for positive MIROSLAVA.  Will update labs.  She has not seen a rheumatologist recently    She sees  and is on alternating breast ultrasound with mammogram.  She will be getting a ultrasound breast May 21, 2024 with a mammogram December 16, 2024    Current Outpatient Medications   Medication Sig Dispense Refill    Cyanocobalamin (VITAMIN B 12 OR) Take by mouth daily.      Calcium Carb-Cholecalciferol (CALCIUM 500 + D OR) Take by mouth daily.      metoprolol succinate ER 25 MG Oral Tablet 24 Hr daily as needed.      dicyclomine 10 MG/5ML Oral Solution Take 5 mL (10 mg total) by mouth daily as needed.        Past Medical History:   Diagnosis Date    Fragile x chromosome (HCC)     positive for fragile X pre-mutation    Osteopenia     Vocal cord paralysis       Social History:  Social History     Socioeconomic History    Marital status:    Tobacco Use    Smoking status: Never    Smokeless tobacco: Never   Vaping Use    Vaping Use: Never used   Substance and Sexual Activity    Alcohol use: Yes      Alcohol/week: 1.0 standard drink of alcohol     Types: 1 Standard drinks or equivalent per week     Comment: beer & wine 1-2 drink on weekends    Drug use: No   Other Topics Concern    Caffeine Concern Yes     Comment: Coffee 2 cups daily; Soda    Pt has a pacemaker No    Pt has a defibrillator No    Reaction to local anesthetic No        REVIEW OF SYSTEMS:   GENERAL HEALTH:  feels well otherwise  RESPIRATORY:  Voices no shortness of breath with exertion or cough  CARDIOVASCULAR: see above  GI:   see above.  She may have some GERD symptoms.   :Viices no urning or frequency of urination.  NEURO:  Voices no  headaches or dizziness    EXAM:   /72   Pulse 65   Temp 98.4 °F (36.9 °C)   Ht 5' 2\" (1.575 m)   Wt 118 lb 12.8 oz (53.9 kg)   SpO2 99%   BMI 21.73 kg/m²     GENERAL:  well developed, well nourished, in no apparent distress  SKIN:  no rashes , no suspicious lesions  HEENT: atraumatic.    Pharynx normal without exudate.  EYES:  PERRL. Sclera anicteric.  NECK:  Supple,  no adenopathy,  thyroid normal  LUNGS:  clear to auscultation.  Effort normal  CARDIO:  RRR without murmur.   S1 and S2 normal  GI:  good BS's,  no masses,   HSM or tenderness  EXTREMITIES : no cyanosis, clubbing or edema    ASSESSMENT AND PLAN:     1. Annual physical exam  Annual physical exam    2. Weight loss  Weight loss.  Resolved.  - CBC With Differential With Platelet; Future  - Comp Metabolic Panel (14); Future  - Lipid Panel; Future  - Urinalysis with Culture Reflex  - Vitamin D; Future  - TSH W Reflex To Free T4; Future    3. Polyp of gallbladder  Ultrasound of gallbladder due in May of this year.  Order given.  - US GALLBLADDER (CPT=76705); Future    4. Elevated antinuclear antibody (MIROSLAVA) level  Update autoimmune profile    5. Routine health maintenance  She did not get her flu vaccine.  She did have her last COVID-vaccine October.  I did tell her new CDC recommendations for an additional booster that can be offered 4 months  after last.  We did talk about RSV vaccine.  She has had Shingrix x 2.  She will consider PCV 20 vaccine.    6. Vitamin D deficiency  Vitamin D deficiency.  Update vitamin D.  She indicates she will contact her rheumatologist for DEXA scan.  - CBC With Differential With Platelet; Future  - Comp Metabolic Panel (14); Future  - Lipid Panel; Future  - Urinalysis with Culture Reflex  - Vitamin D; Future  - TSH W Reflex To Free T4; Future    7. Positive MIROSLAVA (antinuclear antibody)  History of positive MIROSLAVA.  She has seen rheumatology in the past.  Will update connective tissue profile  - Monoclonal Protein Study [E]; Future    8. Vitamin B 12 deficiency  Vitamin B12 deficiency.  Update  B12  - Vitamin B12 with Reflex to MMA; Future    9. Arthritis of first MTP joint  X-ray of right foot.  She has pain over her right MTP joint area.  No signs of gout.  I have given her contact information to Dr. Dr. Jacob  - XR FOOT (2 VIEW), RIGHT (CPT=73620); Future    10.  Tachycardia  Heart rate up to 180 recently.  She actually did not feel that bad.  No syncope.  No feeling ill.  However I did ask her to see Dr. Christian Severino who she has seen before.  Below is copy and pasted April 20, 2023 consult note      ASSESSMENT    Palpitations  Monitor shows rare ectopy with 27 atrial runs currently asymptomatic  Symptoms seem to correlate when her body inflammation or symptoms are worsened and she is feeling great at this pointPrescribed as needed Toprol last time has not needed take it yet  Likely correlating with her brief atrial runs and PACs at a 1.4% burden  Atrial size is normal on echo earlier in the year and no indication of valve disease or LV dysfunction.  No indication of atrial fibrillation or sustained arrhythmias by symptoms or her monitor any other EKGs  Her labs including thyroid are okay no secondary cause for thisDiscussed treatment options we could follow with expectant management repeat an echo next year follow her  symptoms versus use medical therapy daily or as needed. She related when to take daily medications but she wanted something as needed for a bad day or if she is having trouble sleeping with the palpitations  Chest about the risks of other arrhythmias this could progress to atrial fibrillation or other arrhythmias but no indication it has or will but will need to be followedMitral regurgitation  Moderate in 2023  Follow-up test in the futurePlan  Take Toprol 25 mg take once a day as needed for palpitations  Follow-up annually    The patient indicates understanding of these issues and agrees to the plan.    This visit was 30 minutes.  I spent 10 minutes before visit preparing and reviewing old records.  Greater than 50% of the visit was engaged in counseling and review of past data.      Song Garcia MD  3/28/2024  4:20 PM

## 2024-03-29 ENCOUNTER — HOSPITAL ENCOUNTER (OUTPATIENT)
Dept: GENERAL RADIOLOGY | Facility: HOSPITAL | Age: 69
Discharge: HOME | End: 2024-03-29
Attending: INTERNAL MEDICINE
Payer: COMMERCIAL

## 2024-03-29 ENCOUNTER — LAB ENCOUNTER (OUTPATIENT)
Dept: LAB | Facility: HOSPITAL | Age: 69
End: 2024-03-29
Attending: INTERNAL MEDICINE
Payer: COMMERCIAL

## 2024-03-29 DIAGNOSIS — M19.079 ARTHRITIS OF FIRST MTP JOINT: ICD-10-CM

## 2024-03-29 DIAGNOSIS — E55.9 VITAMIN D DEFICIENCY: ICD-10-CM

## 2024-03-29 DIAGNOSIS — E53.8 VITAMIN B 12 DEFICIENCY: ICD-10-CM

## 2024-03-29 DIAGNOSIS — R63.4 WEIGHT LOSS: ICD-10-CM

## 2024-03-29 DIAGNOSIS — R76.8 POSITIVE ANA (ANTINUCLEAR ANTIBODY): ICD-10-CM

## 2024-03-29 LAB
ALBUMIN SERPL-MCNC: 4.6 G/DL (ref 3.2–4.8)
ALBUMIN/GLOB SERPL: 2.1 {RATIO} (ref 1–2)
ALP LIVER SERPL-CCNC: 101 U/L
ALT SERPL-CCNC: 12 U/L
ANION GAP SERPL CALC-SCNC: 6 MMOL/L (ref 0–18)
AST SERPL-CCNC: 19 U/L (ref ?–34)
BASOPHILS # BLD AUTO: 0.07 X10(3) UL (ref 0–0.2)
BASOPHILS NFR BLD AUTO: 1.3 %
BILIRUB SERPL-MCNC: 1.1 MG/DL (ref 0.2–1.1)
BILIRUB UR QL: NEGATIVE
BUN BLD-MCNC: 13 MG/DL (ref 9–23)
BUN/CREAT SERPL: 16.7 (ref 10–20)
CALCIUM BLD-MCNC: 9.9 MG/DL (ref 8.7–10.4)
CHLORIDE SERPL-SCNC: 109 MMOL/L (ref 98–112)
CHOLEST SERPL-MCNC: 196 MG/DL (ref ?–200)
CLARITY UR: CLEAR
CO2 SERPL-SCNC: 28 MMOL/L (ref 21–32)
CREAT BLD-MCNC: 0.78 MG/DL
DEPRECATED RDW RBC AUTO: 38.8 FL (ref 35.1–46.3)
DSDNA IGG SERPL IA-ACNC: <0.6 IU/ML
EGFRCR SERPLBLD CKD-EPI 2021: 82 ML/MIN/1.73M2 (ref 60–?)
ENA AB SER QL IA: 0.1 UG/L
ENA AB SER QL IA: NEGATIVE
EOSINOPHIL # BLD AUTO: 0.11 X10(3) UL (ref 0–0.7)
EOSINOPHIL NFR BLD AUTO: 2 %
ERYTHROCYTE [DISTWIDTH] IN BLOOD BY AUTOMATED COUNT: 12.8 % (ref 11–15)
FASTING PATIENT LIPID ANSWER: YES
FASTING STATUS PATIENT QL REPORTED: YES
GLOBULIN PLAS-MCNC: 2.2 G/DL (ref 2.8–4.4)
GLUCOSE BLD-MCNC: 93 MG/DL (ref 70–99)
GLUCOSE UR-MCNC: NORMAL MG/DL
HCT VFR BLD AUTO: 44.7 %
HDLC SERPL-MCNC: 69 MG/DL (ref 40–59)
HGB BLD-MCNC: 14.9 G/DL
IMM GRANULOCYTES # BLD AUTO: 0.01 X10(3) UL (ref 0–1)
IMM GRANULOCYTES NFR BLD: 0.2 %
KETONES UR-MCNC: NEGATIVE MG/DL
LDLC SERPL CALC-MCNC: 114 MG/DL (ref ?–100)
LEUKOCYTE ESTERASE UR QL STRIP.AUTO: NEGATIVE
LYMPHOCYTES # BLD AUTO: 1.25 X10(3) UL (ref 1–4)
LYMPHOCYTES NFR BLD AUTO: 22.4 %
MCH RBC QN AUTO: 27.7 PG (ref 26–34)
MCHC RBC AUTO-ENTMCNC: 33.3 G/DL (ref 31–37)
MCV RBC AUTO: 83.2 FL
MONOCYTES # BLD AUTO: 0.49 X10(3) UL (ref 0.1–1)
MONOCYTES NFR BLD AUTO: 8.8 %
NEUTROPHILS # BLD AUTO: 3.66 X10 (3) UL (ref 1.5–7.7)
NEUTROPHILS # BLD AUTO: 3.66 X10(3) UL (ref 1.5–7.7)
NEUTROPHILS NFR BLD AUTO: 65.3 %
NITRITE UR QL STRIP.AUTO: NEGATIVE
NONHDLC SERPL-MCNC: 127 MG/DL (ref ?–130)
OSMOLALITY SERPL CALC.SUM OF ELEC: 296 MOSM/KG (ref 275–295)
PH UR: 5 [PH] (ref 5–8)
PLATELET # BLD AUTO: 217 10(3)UL (ref 150–450)
POTASSIUM SERPL-SCNC: 4.3 MMOL/L (ref 3.5–5.1)
PROT SERPL-MCNC: 6.8 G/DL (ref 5.7–8.2)
PROT UR-MCNC: NEGATIVE MG/DL
RBC # BLD AUTO: 5.37 X10(6)UL
SODIUM SERPL-SCNC: 143 MMOL/L (ref 136–145)
SP GR UR STRIP: 1.01 (ref 1–1.03)
TRIGL SERPL-MCNC: 71 MG/DL (ref 30–149)
TSI SER-ACNC: 2.17 MIU/ML (ref 0.55–4.78)
UROBILINOGEN UR STRIP-ACNC: NORMAL
VIT B12 SERPL-MCNC: 1397 PG/ML (ref 211–911)
VIT D+METAB SERPL-MCNC: 26 NG/ML (ref 30–100)
VLDLC SERPL CALC-MCNC: 12 MG/DL (ref 0–30)
WBC # BLD AUTO: 5.6 X10(3) UL (ref 4–11)

## 2024-03-29 PROCEDURE — 86225 DNA ANTIBODY NATIVE: CPT

## 2024-03-29 PROCEDURE — 84443 ASSAY THYROID STIM HORMONE: CPT

## 2024-03-29 PROCEDURE — 80061 LIPID PANEL: CPT

## 2024-03-29 PROCEDURE — 82306 VITAMIN D 25 HYDROXY: CPT

## 2024-03-29 PROCEDURE — 80053 COMPREHEN METABOLIC PANEL: CPT

## 2024-03-29 PROCEDURE — 73630 X-RAY EXAM OF FOOT: CPT | Performed by: INTERNAL MEDICINE

## 2024-03-29 PROCEDURE — 86038 ANTINUCLEAR ANTIBODIES: CPT

## 2024-03-29 PROCEDURE — 81001 URINALYSIS AUTO W/SCOPE: CPT | Performed by: INTERNAL MEDICINE

## 2024-03-29 PROCEDURE — 82607 VITAMIN B-12: CPT

## 2024-03-29 PROCEDURE — 85025 COMPLETE CBC W/AUTO DIFF WBC: CPT

## 2024-03-29 PROCEDURE — 36415 COLL VENOUS BLD VENIPUNCTURE: CPT

## 2024-04-02 ENCOUNTER — TELEPHONE (OUTPATIENT)
Dept: INTERNAL MEDICINE CLINIC | Facility: CLINIC | Age: 69
End: 2024-04-02

## 2024-04-02 NOTE — TELEPHONE ENCOUNTER
Discussed with Annette.    Reviewed labs.    2.   Connective tissue profile was negative.  Although in the past she had positive MIROSLAVA.  For now she does not not wish to see her rheumatologist at [facility] for the time being.  She is considering going to [facility] to try and figure out her episodic symptoms.    3.   Episodic symptoms include abdominal discomfort.  Some I symptoms.  Some mouth sores.    4.   She has seen the gastroenterology department at [facility].  I did give her last office visit note by gastroenterology APRN.  Along with her pathology.  She will be seeing a new gastroenterologist in [place] coming up    5.   Review her foot x-ray.  She will be seen by podiatry.  She has no heel pain.  She has no Achilles tendon pain.    6.   In the end we had a discussion about having her go to [facility].  She actually brought this up resolved.  She does have intermittent symptoms that have defied any unifying diagnosis despite seeing gastroenterology and rheumatology.  Therefore I did support the idea of going to [facility].  She will think about this and let me know if she decides to go to [facility].    7.   Will be seeing cardiology for her episodic tachycardia.    She verbalized understanding to all the above.  She will let me know any updates that she wishes me to know after she sees her specialist.

## 2024-04-08 ENCOUNTER — HOSPITAL ENCOUNTER (OUTPATIENT)
Age: 69
Discharge: HOME | End: 2024-04-08
Payer: COMMERCIAL

## 2024-04-08 ENCOUNTER — TELEPHONE (OUTPATIENT)
Dept: INTERNAL MEDICINE CLINIC | Facility: CLINIC | Age: 69
End: 2024-04-08

## 2024-04-08 ENCOUNTER — APPOINTMENT (OUTPATIENT)
Dept: CT IMAGING | Age: 69
End: 2024-04-08
Attending: PHYSICIAN ASSISTANT
Payer: COMMERCIAL

## 2024-04-08 VITALS
TEMPERATURE: 98 F | RESPIRATION RATE: 16 BRPM | OXYGEN SATURATION: 97 % | SYSTOLIC BLOOD PRESSURE: 128 MMHG | DIASTOLIC BLOOD PRESSURE: 52 MMHG | HEART RATE: 61 BPM

## 2024-04-08 DIAGNOSIS — R10.30 LOWER ABDOMINAL PAIN: Primary | ICD-10-CM

## 2024-04-08 LAB
#MXD IC: 0.6 X10ˆ3/UL (ref 0.1–1)
BILIRUB UR QL STRIP: NEGATIVE
BUN BLD-MCNC: 7 MG/DL (ref 7–18)
CHLORIDE BLD-SCNC: 103 MMOL/L (ref 98–112)
CLARITY UR: CLEAR
CO2 BLD-SCNC: 31 MMOL/L (ref 21–32)
COLOR UR: YELLOW
CREAT BLD-MCNC: 0.8 MG/DL
EGFRCR SERPLBLD CKD-EPI 2021: 79 ML/MIN/1.73M2 (ref 60–?)
GLUCOSE BLD-MCNC: 87 MG/DL (ref 70–99)
GLUCOSE UR STRIP-MCNC: NEGATIVE MG/DL
HCT VFR BLD AUTO: 47.5 %
HCT VFR BLD CALC: 48 %
HGB BLD-MCNC: 15 G/DL
ISTAT IONIZED CALCIUM FOR CHEM 8: 1.28 MMOL/L (ref 1.12–1.32)
KETONES UR STRIP-MCNC: NEGATIVE MG/DL
LEUKOCYTE ESTERASE UR QL STRIP: NEGATIVE
LYMPHOCYTES # BLD AUTO: 1.6 X10ˆ3/UL (ref 1–4)
LYMPHOCYTES NFR BLD AUTO: 24.9 %
MCH RBC QN AUTO: 26.7 PG (ref 26–34)
MCHC RBC AUTO-ENTMCNC: 31.6 G/DL (ref 31–37)
MCV RBC AUTO: 84.5 FL (ref 80–100)
MIXED CELL %: 9.2 %
NEUTROPHILS # BLD AUTO: 4.1 X10ˆ3/UL (ref 1.5–7.7)
NEUTROPHILS NFR BLD AUTO: 65.9 %
NITRITE UR QL STRIP: NEGATIVE
PH UR STRIP: 5.5 [PH]
PLATELET # BLD AUTO: 271 X10ˆ3/UL (ref 150–450)
POTASSIUM BLD-SCNC: 3.8 MMOL/L (ref 3.6–5.1)
PROT UR STRIP-MCNC: NEGATIVE MG/DL
RBC # BLD AUTO: 5.62 X10ˆ6/UL
SODIUM BLD-SCNC: 145 MMOL/L (ref 136–145)
SP GR UR STRIP: 1.01
UROBILINOGEN UR STRIP-ACNC: <2 MG/DL
WBC # BLD AUTO: 6.3 X10ˆ3/UL (ref 4–11)

## 2024-04-08 PROCEDURE — 99214 OFFICE O/P EST MOD 30 MIN: CPT

## 2024-04-08 PROCEDURE — 87086 URINE CULTURE/COLONY COUNT: CPT | Performed by: PHYSICIAN ASSISTANT

## 2024-04-08 PROCEDURE — 74177 CT ABD & PELVIS W/CONTRAST: CPT | Performed by: PHYSICIAN ASSISTANT

## 2024-04-08 PROCEDURE — 81002 URINALYSIS NONAUTO W/O SCOPE: CPT

## 2024-04-08 PROCEDURE — 85025 COMPLETE CBC W/AUTO DIFF WBC: CPT | Performed by: PHYSICIAN ASSISTANT

## 2024-04-08 PROCEDURE — 80047 BASIC METABLC PNL IONIZED CA: CPT

## 2024-04-08 PROCEDURE — 36415 COLL VENOUS BLD VENIPUNCTURE: CPT

## 2024-04-08 NOTE — TELEPHONE ENCOUNTER
Please let Annette know that I received her message.    I am wondering if she needs an updated CT scan.  With this in mind it may be wise for her to be seen in [place] urgent [facility].  There she can get some blood test and a CT scan of the abdomen if it is felt needed by the healthcare providers there.    She can go there now since she is feeling poorly.    This way we can make sure there is nothing acute going on.    I think she is going to see a gastroenterologist at [place].  Please ask her if that appointment is coming up soon.  And also the name of the gastroenterologist.

## 2024-04-08 NOTE — TELEPHONE ENCOUNTER
Calledpatient and relayed  message - verbalized understanding    Will go to [facility] F/U 4/9    As FYI to  - her GI dequan is 4/18/24 with DR. Booker

## 2024-04-08 NOTE — TELEPHONE ENCOUNTER
Patient sent a message through [facility] requesting an appointment for today -   My abdominal pain has increased. I am nauseous with chills on and off.

## 2024-04-08 NOTE — ED INITIAL ASSESSMENT (HPI)
Patient arrives ambulatory with c/o LLQ pain x 10days, worse yesterday. +nausea, +diarrhea. Reports chills and weakness. Denies fevers. Reports hx IBS, reports taking rifaximin that was prescribed by her GI doctor, without relief.

## 2024-04-08 NOTE — ED PROVIDER NOTES
Patient Seen in: [facility]      History     Chief Complaint   Patient presents with    Abdomen/Flank Pain     Stated Complaint: abdominal pain and chills    Subjective:   HPI    Patient is a 69-year-old female with past medical history of IBS, GERD, diverticulosis that presents to immediate care due to worsening abdominal pain over the past 10 days.  Patient states that she has had intermittent diarrhea, left lower quadrant pain worsening with past few days.  Associate symptoms include intermittent nausea, chills.  States over the past few days she has been taking rifaximin previously prescribed for her IBS however states medication is not improving her symptoms.  Has follow-up with GI in 1 week.  Denies dysuria hematuria flank pain, vomiting, melena, hematochezia.     Objective:   Past Medical History:   Diagnosis Date    Fragile x chromosome (HCC)     positive for fragile X pre-mutation    Osteopenia     Vocal cord paralysis               Past Surgical History:   Procedure Laterality Date    COLONOSCOPY  08/2012    COLONOSCOPY  12/2015    [facility]    D & C      EGD  12/2015    [facility]                Social History     Socioeconomic History    Marital status:    Tobacco Use    Smoking status: Never    Smokeless tobacco: Never   Vaping Use    Vaping Use: Never used   Substance and Sexual Activity    Alcohol use: Yes     Alcohol/week: 1.0 standard drink of alcohol     Types: 1 Standard drinks or equivalent per week     Comment: beer & wine 1-2 drink on weekends    Drug use: No   Other Topics Concern    Caffeine Concern Yes     Comment: Coffee 2 cups daily; Soda    Pt has a pacemaker No    Pt has a defibrillator No    Reaction to local anesthetic No              Review of Systems    Positive for stated complaint: abdominal pain and chills  Other systems are as noted in HPI.  Constitutional and vital signs reviewed.      All other systems reviewed and negative except as noted above.    Physical Exam      ED Triage Vitals [04/08/24 1124]   /52   Pulse 61   Resp 16   Temp 97.5 °F (36.4 °C)   Temp src Temporal   SpO2 97 %   O2 Device None (Room air)       Current:/52   Pulse 61   Temp 97.5 °F (36.4 °C) (Temporal)   Resp 16   SpO2 97%         Physical Exam    Vital signs reviewed. Nursing note reviewed.  Constitutional: Well-developed. Well-nourished. In no acute distress  HENT: Mucous membranes moist.   EYES: No scleral icterus or conjunctival injection.  NECK: Full ROM. Supple.   CARDIAC: Normal rate. Normal S1/ S2. 2+ distal pulses. No edema  PULM/CHEST: Clear to auscultation bilaterally. No wheezes  ABD: Soft, mild +LLQ ttp, non-distended.   : No CVA tenderness.  RECTAL: deferred  Extremities: Full ROM  NEURO: Awake, alert, following commands, moving extremities, answering questions.   SKIN: Warm and dry. No rash or lesions.  PSYCH: Normal judgment. Normal affect.               MDM      Patient is a 69-year-old female with history of GERD, IBS that presents to immediate care due to left lower quadrant pain worsening over the past 10 days.  Patient arrives with stable vitals sitting comfortably.  On physical exam patient is not peritonitic however showing mild left lower quadrant tenderness to palpation.  Will obtain basic labs urinalysis, CT abdomen with contrast to evaluate for diverticulitis, UTI, less likely pyelonephritis nephrolithiasis.  Possible acute on chronic IBS.  History given by patient.  Care discussed with attending Dr. Soria at this time.           1:01 PM  Discussed reassuring labs and imaging with patient.  CT negative for acute abdominal pathology.  Lab work reassuring with no significant electrolyte derangement, no leukocytosis.  Urinalysis negative for infection.  Discussed with patient symptoms most likely acute on chronic IBS.  Patient does have close GI follow-up in 1 week.  ED precautions discussed including new or worsening pain, symptoms.  Patient agreeable to plan  all questions answered.                         Medical Decision Making      Disposition and Plan     Clinical Impression:  1. Lower abdominal pain         Disposition:  Discharge  4/8/2024 12:59 pm    Follow-up:  Song Garcia MD  [address] [place] [id number]  [phone]    Call             Medications Prescribed:  Current Discharge Medication List

## 2024-04-08 NOTE — TELEPHONE ENCOUNTER
Please advise -called patient who still has abdominal pain - was seen about 2 weeks ago. It wakes her up at night .worse when lying down.feeling weak , gets chills and nausea , denies fever -to

## 2024-04-09 ENCOUNTER — TELEPHONE (OUTPATIENT)
Dept: INTERNAL MEDICINE CLINIC | Facility: CLINIC | Age: 69
End: 2024-04-09

## 2024-04-09 NOTE — TELEPHONE ENCOUNTER
Discussed with Annette.     I reviewed her visit yesterday in urgent care.  Her CT scan did not show anything acute.  It did show coronary atherosclerosis and she will discuss this with Dr. Severino her cardiologist who she will be seeing next week    She does have a gas and fluid-filled diverticulum in the gastric fundus.  She does at night have some belching.  For this I told her to take some omeprazole daily.    She will also be seeing her gastroenterologist who will be new to her next week.  He will review her past history and decide if she needs any endoscopies or try to come up with a unifying diagnosis.    She does have a history of a positive MIROSLAVA and had been followed at [facility] in the rheumatology department.    She also has an appoint with [facility] in the GI department in June.  I told her that this was very good as they can add insight into her symptoms.  She also may need to see rheumatology there as she does have a history of positive MIROSLAVA.  She has had some mouth sores in the past and some vision problems.    She was appreciative of the phone call.  We talked about seeing me but if she is can to see cardiology and GI next week it is felt she does not need to see me but she knows she can call me and see me if she wishes.  She agrees with the plan.

## 2024-04-10 ENCOUNTER — TELEPHONE (OUTPATIENT)
Dept: INTERNAL MEDICINE CLINIC | Facility: CLINIC | Age: 69
End: 2024-04-10

## 2024-05-21 ENCOUNTER — HOSPITAL ENCOUNTER (OUTPATIENT)
Dept: ULTRASOUND IMAGING | Facility: HOSPITAL | Age: 69
Discharge: HOME | End: 2024-05-21
Attending: SURGERY

## 2024-05-21 DIAGNOSIS — R92.30 DENSE BREASTS: ICD-10-CM

## 2024-05-21 PROCEDURE — 76641 ULTRASOUND BREAST COMPLETE: CPT | Performed by: SURGERY

## 2024-06-13 ENCOUNTER — OFFICE VISIT (OUTPATIENT)
Dept: OBGYN CLINIC | Facility: CLINIC | Age: 69
End: 2024-06-13
Payer: COMMERCIAL

## 2024-06-13 VITALS
HEART RATE: 58 BPM | SYSTOLIC BLOOD PRESSURE: 120 MMHG | BODY MASS INDEX: 21 KG/M2 | WEIGHT: 115 LBS | DIASTOLIC BLOOD PRESSURE: 79 MMHG

## 2024-06-13 DIAGNOSIS — Z01.419 ENCOUNTER FOR GYNECOLOGICAL EXAMINATION WITHOUT ABNORMAL FINDING: Primary | ICD-10-CM

## 2024-06-13 PROCEDURE — 99397 PER PM REEVAL EST PAT 65+ YR: CPT | Performed by: OBSTETRICS & GYNECOLOGY

## 2024-06-13 NOTE — PROGRESS NOTES
Annette Hollingsworth is a 69 year old female  No LMP recorded. Patient is postmenopausal.   Chief Complaint   Patient presents with    Gyn Exam     New patient, annual   .  She has no complaints.  Denies postmenopausal bleeding, urinary or sexual issues.      OBSTETRICS HISTORY:     OB History    Para Term  AB Living   3 3 3     3   SAB IAB Ectopic Multiple Live Births           3      # Outcome Date GA Lbr Fred/2nd Weight Sex Type Anes PTL Lv   3 Term      NORMAL SPONT   FROYLAN   2 Term      NORMAL SPONT   FROYLAN   1 Term      NORMAL SPONT   FROYLAN       GYNE HISTORY:     Periods none due to menopause        Latest Ref Rng & Units 2017     3:00 PM   RECENT PAP RESULTS   Thinprep Pap  Negative for intraepithelial lesion or malignancy    HPV Negative Negative          MEDICAL HISTORY:     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    IBS (irritable bowel syndrome)    Migraine, ophthalmoplegic    Osteopenia    Tachycardia    Vocal cord paralysis     Past Surgical History:   Procedure Laterality Date    Colonoscopy  2012    Colonoscopy  2015    [facility]    D & c      abn VB during perimenopause    Egd  2015    [facility]     OB History    Para Term  AB Living   3 3 3 0 0 3   SAB IAB Ectopic Multiple Live Births   0 0 0 0 3        SOCIAL HISTORY:     Social History     Socioeconomic History    Marital status:      Spouse name: Not on file    Number of children: Not on file    Years of education: Not on file    Highest education level: Not on file   Occupational History    Not on file   Tobacco Use    Smoking status: Never    Smokeless tobacco: Never   Vaping Use    Vaping status: Never Used   Substance and Sexual Activity    Alcohol use: Yes     Alcohol/week: 1.0 standard drink of alcohol     Types: 1 Standard drinks or equivalent per week     Comment: beer & wine 1-2 drink on weekends    Drug use: No    Sexual  activity: Not on file   Other Topics Concern     Service Not Asked    Blood Transfusions Not Asked    Caffeine Concern Yes     Comment: Coffee 2 cups daily; Soda    Occupational Exposure Not Asked    Hobby Hazards Not Asked    Sleep Concern Not Asked    Stress Concern Not Asked    Weight Concern Not Asked    Special Diet Not Asked    Back Care Not Asked    Exercise Not Asked    Bike Helmet Not Asked    Seat Belt Not Asked    Self-Exams Not Asked    Grew up on a farm Not Asked    History of tanning Not Asked    Outdoor occupation Not Asked    Pt has a pacemaker No    Pt has a defibrillator No    Breast feeding Not Asked    Reaction to local anesthetic No   Social History Narrative    Not on file     Social Determinants of Health     Financial Resource Strain: Not on file   Food Insecurity: Not on file   Transportation Needs: Not on file   Physical Activity: Not on file   Stress: Not on file   Social Connections: Unknown (3/8/2021)    [facility]    Social Connections     Conversations with friends/family/neighbors per week: Not on file   Housing Stability: Low Risk  (7/17/2021)    [facility]    Housing Stability     Mortgage Payment Concerns?: Not on file     Number of Places Lived in the Last Year: Not on file     Unstable Housing?: Not on file       FAMILY HISTORY:     Family History   Problem Relation Age of Onset    Alcohol and Other Disorders Associated Father         cirrhosis from etoh    Cancer Mother 59        lung smoker    Other (Other) Daughter         Autoimmune, platelet problem. Daughter is fragile X carrier    Diabetes Paternal Grandfather     Cancer Paternal Grandfather 70        lung smoker       MEDICATIONS:       Current Outpatient Medications:     Calcium Carb-Cholecalciferol (CALCIUM 500 + D OR), Take by mouth daily., Disp: , Rfl:     metoprolol succinate ER  25 MG Oral Tablet 24 Hr, daily as needed., Disp: , Rfl:     dicyclomine 10 MG/5ML Oral Solution, Take 5 mL (10 mg total) by mouth daily as needed., Disp: , Rfl:     Cyanocobalamin (VITAMIN B 12 OR), Take by mouth daily., Disp: , Rfl:     ALLERGIES:       Allergies   Allergen Reactions    Penicillin G UNKNOWN    Penicillins RASH     Rash when young child         REVIEW OF SYSTEMS:     Constitutional:    denies fever / chills  Eyes:     denies blurred or double vision  Cardiovascular:  denies chest pain or palpitations  Respiratory:    denies shortness of breath  Gastrointestinal:  denies severe abdominal pain, frequent diarrhea or constipation, nausea / vomiting  Genitourinary:    denies dysuria, bothersome incontinence  Skin/Breast:   denies any breast pain, lumps, or discharge  Neurological:    denies frequent severe headaches  Psychiatric:   denies depression or anxiety, thoughts of harming self or others  Heme/Lymph:    denies easy bruising or bleeding    PHYSICAL EXAM:   Blood pressure 120/79, pulse 58, weight 115 lb (52.2 kg).  Constitutional:  well developed, well nourished  Head/Face:  normocephalic  Neck/Thyroid:  thyroid symmetric, no thyromegaly, no nodules, no adenopathy  Lymphatic: no abnormal supraclavicular or axillary adenopathy is noted  Breast:   normal without palpable masses, tenderness, asymmetry, nipple discharge, nipple retraction or skin changes  Respiratory:   nonlabored breathing  Cardiovascular: regular rate and rhythm  Abdomen:   soft, nontender, nondistended, no masses  Skin/Hair: no unusual rashes or bruises  Extremities:  no edema, no cyanosis  Psychiatric:   Oriented to time, place, person and situation. Appropriate mood and affect    Pelvic Exam:  External Genitalia:  normal appearance, hair distribution, and no lesions  Urethral Meatus:   normal in size, location, without lesions and prolapse  Bladder:    no fullness, masses or tenderness  Vagina:    normal appearance without  lesions, no abnormal discharge  Cervix:    normal without tenderness on motion  Uterus:    normal in size, contour, position, mobility, without tenderness  Adnexa:   normal without masses or tenderness  Perineum:   normal  Anus:    no hemorroids    ASSESSMENT & PLAN:     Annette was seen today for gyn exam.    Diagnoses and all orders for this visit:    Encounter for gynecological examination without abnormal finding          SUMMARY:    Pap: Next cotest today -- if negative / negative no more paps per ASCCP guidelines.    Mammogram: done recently    BCM: Postmenopausal    STD screening: declines    Colon cancer screening: UTD    Misc: Calcium needs reviewed (1500 mg diet + supplement). Weight bearing exercise encouraged. Call if any VB (if perimenopausal, reviewed abn VB patterns)    HM updated    Depression screen:   Depression Screening (PHQ-2/PHQ-9): Over the LAST 2 WEEKS   Little interest or pleasure in doing things (over the last two weeks)?: Not at all    Feeling down, depressed, or hopeless (over the last two weeks)?: Not at all    PHQ-2 SCORE: 0          FOLLOW-UP     Return in about 1 year (around 6/13/2025) for annual gyne exam.    Note to patient and family:  The 21st Century Cures Act makes medical notes available to patients in the interest of transparency.  However, please be advised that this is a medical document.  It is intended as a peer to peer communication.  It is written in medical language and may contain abbreviations or verbiage that are technical and unfamiliar.  It may appear blunt or direct.  Medical documents are intended to carry relevant information, facts as evident, and the clinical opinion of the practitioner.

## 2024-06-14 LAB — HPV E6+E7 MRNA CVX QL NAA+PROBE: NEGATIVE

## 2024-06-18 ENCOUNTER — HOSPITAL ENCOUNTER (OUTPATIENT)
Dept: ULTRASOUND IMAGING | Facility: HOSPITAL | Age: 69
Discharge: HOME | End: 2024-06-18
Attending: INTERNAL MEDICINE

## 2024-06-18 DIAGNOSIS — K82.4 POLYP OF GALLBLADDER: ICD-10-CM

## 2024-06-18 PROCEDURE — 76705 ECHO EXAM OF ABDOMEN: CPT | Performed by: INTERNAL MEDICINE

## 2024-06-20 ENCOUNTER — TELEPHONE (OUTPATIENT)
Dept: INTERNAL MEDICINE CLINIC | Facility: CLINIC | Age: 69
End: 2024-06-20

## 2024-06-20 NOTE — TELEPHONE ENCOUNTER
Please let Annette know that her ultrasound of the gallbladder continues to show a very small gallbladder polyp.  The size remains unchanged from May of last year.  It has not grown.    The the radiologist reading the current ultrasound of the gallbladder indicates that the polyp is \"near the neck of the gallbladder that is unchanged\".    I did make copies of this last ultrasound the gallbladder along with the prior 2.    My one concern is the mentioned that it is near the \"neck\" of the gallbladder.  I am not thinking this will ever be a problem for her but I would like her to discuss the findings with her gastroenterologist  who she saw her recently.  Since it is near the \"neck\" of the gallbladder I do not want it to eventually obstruct any flow of bile which could cause her problems.  This is just to make sure there is nothing else we should be watching out for and to ask him when next ultrasound should be.    Copies of the last 3 ultrasounds in outbox.  We can either mail to Annette or she can  at .  She can then deliver them to .

## 2024-06-20 NOTE — TELEPHONE ENCOUNTER
Spoke with patient. Relayed MD message. Pt verbalized understanding.     Pt wishes to print the reports from [facility].    Advised pt call back with any questions/concerns/worsening symptoms.

## 2024-11-18 ENCOUNTER — OFFICE VISIT (OUTPATIENT)
Dept: PODIATRY CLINIC | Facility: CLINIC | Age: 69
End: 2024-11-18
Payer: COMMERCIAL

## 2024-11-18 VITALS — DIASTOLIC BLOOD PRESSURE: 70 MMHG | SYSTOLIC BLOOD PRESSURE: 118 MMHG

## 2024-11-18 DIAGNOSIS — M77.51 CAPSULITIS OF METATARSOPHALANGEAL (MTP) JOINT OF RIGHT FOOT: ICD-10-CM

## 2024-11-18 DIAGNOSIS — H62.40 OTOMYCOSIS: ICD-10-CM

## 2024-11-18 DIAGNOSIS — B36.9 OTOMYCOSIS: ICD-10-CM

## 2024-11-18 DIAGNOSIS — M20.5X1 HALLUX LIMITUS OF RIGHT FOOT: Primary | ICD-10-CM

## 2024-11-18 PROCEDURE — 87101 SKIN FUNGI CULTURE: CPT | Performed by: PODIATRIST

## 2024-11-18 RX ORDER — TRIAMCINOLONE ACETONIDE 40 MG/ML
20 INJECTION, SUSPENSION INTRA-ARTICULAR; INTRAMUSCULAR ONCE
Status: COMPLETED | OUTPATIENT
Start: 2024-11-18 | End: 2024-11-18

## 2024-11-18 NOTE — PROGRESS NOTES
.   Notified Dr Lauren regarding potassium level of 3.3 and patient's episode of nausea and vomiting this morning. Received orders to enter potassium supplement order set.

## 2024-11-18 NOTE — PROGRESS NOTES
Annette Hollingsworth is a 69 year old female.   Chief Complaint   Patient presents with    Foot Pain     Consult right foot dorsal aspect pain 0-4/10 worse after walking  for about 3-4 miles, and toe discoloration on 1st and 4-5 toes. Has Xr from March in Epic.         HPI:   Patient presents to the clinic at today's visit with a chief complaint of pain in her right great toe joint she has had x-rays taken she realizes its arthritic.  She also complains of several toenails on her right foot that are becoming thickened and discolored.  At today's visit reviewed nurse's history as taken above, allergies medications and medical history as documented below.  All changes duly noted  Allergies: Penicillin g and Penicillins   Current Outpatient Medications   Medication Sig Dispense Refill    Calcium Carb-Cholecalciferol (CALCIUM 500 + D OR) Take by mouth daily.      metoprolol succinate ER 25 MG Oral Tablet 24 Hr daily as needed.      dicyclomine 10 MG/5ML Oral Solution Take 5 mL (10 mg total) by mouth daily as needed.        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    IBS (irritable bowel syndrome)    Migraine, ophthalmoplegic    Osteopenia    Tachycardia    Vocal cord paralysis      Past Surgical History:   Procedure Laterality Date    Colonoscopy  08/2012    Colonoscopy  12/2015    [facility]    D & c      abn VB during perimenopause    Egd  12/2015    [facility]      Family History   Problem Relation Age of Onset    Alcohol and Other Disorders Associated Father         cirrhosis from etoh    Cancer Mother 59        lung smoker    Other (Other) Daughter         Autoimmune, platelet problem. Daughter is fragile X carrier    Diabetes Paternal Grandfather     Cancer Paternal Grandfather 70        lung smoker      Social History     Socioeconomic History    Marital status:    Tobacco Use    Smoking status: Never    Smokeless tobacco: Never   Vaping Use    Vaping status:  Never Used   Substance and Sexual Activity    Alcohol use: Yes     Alcohol/week: 1.0 standard drink of alcohol     Types: 1 Standard drinks or equivalent per week     Comment: beer & wine 1-2 drink on weekends    Drug use: No   Other Topics Concern    Caffeine Concern Yes     Comment: Coffee 2 cups daily; Soda    Pt has a pacemaker No    Pt has a defibrillator No    Reaction to local anesthetic No           REVIEW OF SYSTEMS:   Today reviewed systens as documented below  GENERAL HEALTH: feels well otherwise  SKIN: Refer to exam below  RESPIRATORY: denies shortness of breath with exertion  CARDIOVASCULAR: denies chest pain on exertion  GI: denies abdominal pain and denies heartburn  NEURO: denies headaches    EXAM:   /70 (BP Location: Right arm, Patient Position: Sitting, Cuff Size: adult)   GENERAL: well developed, well nourished, in no apparent distress  EXTREMITIES:   1. Integument: The skin on both feet was evaluated is warm and dry there is a dorsal and medial eminence of the first metatarsal head on the right compared to the left with the foot in a loaded position.  Several of her toenails on the right foot including the hallux in a couple of the lesser toenails are thickened and dystrophic clippings were taken as well as for fungal culture.   2. Vascular: Patient has palpable dorsalis pedis posterior tibial pulses bilateral   3. Neurologic: Patient has intact sensorium is no deficits noted   4. Musculoskeletal: Patient has good muscle strength.    ASSESSMENT AND PLAN:   Diagnoses and all orders for this visit:    Hallux limitus of right foot  -     triamcinolone acetonide (Kenalog-40) 40 MG/ML injection 20 mg    Capsulitis of metatarsophalangeal (MTP) joint of right foot  -     triamcinolone acetonide (Kenalog-40) 40 MG/ML injection 20 mg    Otomycosis  -     Dermatophyte Only, Culture [E]; Future        Plan: At today's office visit I discussed everything with the patient she probably wants to proceed  with a topical but it is a good idea to continue to see what fungus is growing there.  In addition to that I educated her about the great toe joint she has to wear a stiff soled well-padded shoe which will help decrease motion and that will help.Today discussed the nature and extent of a cortisone injection as well as the possible complications and risks.  Patient was in agreement to receive the injection.  The patient was consented for a cortisone injection and after timeout was taken the injection consisting of 20 mg Kenalog and 0.5 cc of 0.5% Marcaine plain was injected into the symptomatic first metatarsal phalangeal joint, right foot, using aseptic technique and appropriate approach.  A Band-Aid was applied to the injection site.   Follow-up in a few weeks but she may be leaving soon on a trip to [facility].  The patient indicates understanding of these issues and agrees to the plan.    Awais Coyne DPM

## 2024-12-04 ENCOUNTER — TELEPHONE (OUTPATIENT)
Dept: PODIATRY CLINIC | Facility: CLINIC | Age: 69
End: 2024-12-04

## 2024-12-04 NOTE — TELEPHONE ENCOUNTER
----- Message from Awais Coyne sent at 11/22/2024 11:07 AM CST -----  Results reviewed.  Please inform patient that fungal culture results are positive and we should proceed with Lamisil tablet therapy.  If the patient agrees we have to do a hepatic function panel, please place that order for me with a diagnosis of onychomycosis.

## 2024-12-10 NOTE — TELEPHONE ENCOUNTER
Patient called would like not to proceed with lamisil tablet, would like to consider a topical option. Please call back.

## 2024-12-11 RX ORDER — CICLOPIROX 80 MG/ML
SOLUTION TOPICAL
Qty: 6 ML | Refills: 10 | Status: SHIPPED | OUTPATIENT
Start: 2024-12-11

## 2024-12-11 NOTE — TELEPHONE ENCOUNTER
Please call and let the patient know that I prescribed ciclopirox nail solution for her.  I should probably follow her up in about 3 to 4 months after she begins use

## 2024-12-16 ENCOUNTER — HOSPITAL ENCOUNTER (OUTPATIENT)
Dept: MAMMOGRAPHY | Facility: HOSPITAL | Age: 69
Discharge: HOME | End: 2024-12-16
Attending: SURGERY
Payer: COMMERCIAL

## 2024-12-16 DIAGNOSIS — Z12.31 SCREENING MAMMOGRAM FOR BREAST CANCER: ICD-10-CM

## 2024-12-16 PROCEDURE — 77067 SCR MAMMO BI INCL CAD: CPT | Performed by: SURGERY

## 2024-12-16 PROCEDURE — 77063 BREAST TOMOSYNTHESIS BI: CPT | Performed by: SURGERY

## 2025-02-05 NOTE — TELEPHONE ENCOUNTER
S/w [facility] pharmacy and they informed me that medication had already been picked up on 12/11/24 and 2/3/25

## 2025-02-05 NOTE — TELEPHONE ENCOUNTER
Fax received from [facility] pharmacy requesting a Prior Authorization for Ciclopirox 8% Solution. PA initiated.

## 2025-02-19 ENCOUNTER — PATIENT MESSAGE (OUTPATIENT)
Dept: PODIATRY CLINIC | Facility: CLINIC | Age: 70
End: 2025-02-19

## 2025-02-20 ENCOUNTER — TELEPHONE (OUTPATIENT)
Dept: PODIATRY CLINIC | Facility: CLINIC | Age: 70
End: 2025-02-20

## 2025-02-20 NOTE — TELEPHONE ENCOUNTER
Called [facility] to see if I could get number to check on prior auth. Was given number [phone] and ID [id number].    Called [employer], spoke with Demetri. Was able to start PA, but they insisted on faxing form and not doing PA over the phone. When I pushed for this, he placed me on an unannounced hold. I hung up.    Received fax, but it was relating to thyroid medication, not toenail fungus medication.    Called them back and spoke with Katey, who is faxing another form, hopefully the correct one.    Sent mychart to patient with an update.

## 2025-04-03 ENCOUNTER — OFFICE VISIT (OUTPATIENT)
Dept: INTERNAL MEDICINE CLINIC | Facility: CLINIC | Age: 70
End: 2025-04-03
Payer: COMMERCIAL

## 2025-04-03 VITALS
HEART RATE: 66 BPM | HEIGHT: 62 IN | RESPIRATION RATE: 16 BRPM | OXYGEN SATURATION: 99 % | SYSTOLIC BLOOD PRESSURE: 118 MMHG | TEMPERATURE: 98 F | DIASTOLIC BLOOD PRESSURE: 70 MMHG | BODY MASS INDEX: 21.71 KG/M2 | WEIGHT: 118 LBS

## 2025-04-03 DIAGNOSIS — K82.4 POLYP OF GALLBLADDER: ICD-10-CM

## 2025-04-03 DIAGNOSIS — G93.5 CHIARI MALFORMATION TYPE I (HCC): ICD-10-CM

## 2025-04-03 DIAGNOSIS — E55.9 VITAMIN D DEFICIENCY: ICD-10-CM

## 2025-04-03 DIAGNOSIS — R76.8 POSITIVE ANA (ANTINUCLEAR ANTIBODY): ICD-10-CM

## 2025-04-03 DIAGNOSIS — Z00.00 ANNUAL PHYSICAL EXAM: Primary | ICD-10-CM

## 2025-04-03 DIAGNOSIS — R63.4 WEIGHT LOSS: ICD-10-CM

## 2025-04-03 DIAGNOSIS — Z00.00 ROUTINE HEALTH MAINTENANCE: ICD-10-CM

## 2025-04-03 DIAGNOSIS — Z86.0100 HISTORY OF COLONIC POLYPS: ICD-10-CM

## 2025-04-03 DIAGNOSIS — Z23 NEED FOR VACCINATION AGAINST STREPTOCOCCUS PNEUMONIAE: ICD-10-CM

## 2025-04-03 DIAGNOSIS — Z86.69 HISTORY OF MIGRAINE: ICD-10-CM

## 2025-04-03 RX ORDER — RIFAXIMIN 550 MG/1
550 TABLET ORAL 3 TIMES DAILY
COMMUNITY
Start: 2025-03-18

## 2025-04-03 NOTE — PROGRESS NOTES
Annette Hollingsworth is a 70 year old female.  HPI:     Chief Complaint   Patient presents with    Physical     ANNUAL PHYSICAL        Annette presents for annual physical.    She generally feels well.    She does have dry eyes.  She has seen ophthalmology.    She also has \"dry nose but also a runny nose.  We talked about this.  No major obstruction that I can determine.  No epistaxis.  I did discuss that she may want to see ENT.    We talked about vaccines.  We talked about getting PCV 20 vaccine.  She does not wish to get flu vaccine.  She has had Shingrix x 2.  She did get COVID booster in fall and I did tell her that CDC recommends a 6-month COVID booster.  She verbalized understanding    She sees .  From neurology.  She has a history of migraines.  Ocular migraines.    She also sees gastroenterology.  .  No new bowel issues.  She is felt to have bloating.  Irritable bowel syndrome with diarrhea.  Personal history of adenomatous and serrated colonic polyps.    Plan was Xifaxan as she needs.    She indicates her next colonoscopy will be December 2026.    She also follows with Dr. Oh for her gynecology exams.    Also will see Dr. Grey for breast exams and follow-up breast cancer screening.  Appointment May 9.    She has a history of tachycardia.  Workup in the past has been negative.  She has an appointment with Dr. Severino in May    She also follows with  from rheumatology.  Indicates that she does have osteoporosis now and will be contacting rheumatology for treatment.  Also there was notation from visit October 14, 2024 that she has a history of positive MIROSLAVA with some oral lesions.    She indicates that she is been having some itchy skin lesions.  She has 1 on her leg that just measures about 1 mm or 2 mm.  Not met.  She indicates she will get them on her back.  They are extremely itchy.  She will follow-up with dermatology.  Current Outpatient Medications   Medication Sig  Dispense Refill    XIFAXAN 550 MG Oral Tab Take 1 tablet (550 mg total) by mouth 3 (three) times daily.      Ciclopirox 8 % External Solution Apply to all affected toenails once daily following all label directions 6 mL 10    Calcium Carb-Cholecalciferol (CALCIUM 500 + D OR) Take by mouth daily.      metoprolol succinate ER 25 MG Oral Tablet 24 Hr daily as needed.      dicyclomine 10 MG/5ML Oral Solution Take 5 mL (10 mg total) by mouth daily as needed.        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    IBS (irritable bowel syndrome)    Migraine, ophthalmoplegic    Osteopenia    Osteoporosis    Tachycardia    Vocal cord paralysis      Social History:  Social History     Socioeconomic History    Marital status:    Tobacco Use    Smoking status: Never    Smokeless tobacco: Never   Vaping Use    Vaping status: Never Used   Substance and Sexual Activity    Alcohol use: Yes     Alcohol/week: 1.0 standard drink of alcohol     Types: 1 Standard drinks or equivalent per week     Comment: beer & wine 1-2 drink on weekends    Drug use: No   Other Topics Concern    Caffeine Concern Yes     Comment: Coffee 2 cups daily; Soda    Pt has a pacemaker No    Pt has a defibrillator No    Reaction to local anesthetic No     Social Drivers of Health      [facility]    Housing Stability        REVIEW OF SYSTEMS:   GENERAL HEALTH:  feels well otherwise  RESPIRATORY:  Voices no shortness of breath with exertion or cough  CARDIOVASCULAR:  Voices no chest pain on exertion or shortness of breath  GI:   Voices no abdominal pain or changes of bowels   :Viices no urning or frequency of urination.  NEURO:  Voices no  headaches or dizziness    EXAM:   /70   Pulse 66   Temp 97.8 °F (36.6 °C) (Oral)   Resp 16   Ht 5' 2\" (1.575 m)   Wt 118 lb (53.5 kg)   SpO2 99%   BMI 21.58 kg/m²     GENERAL:  well developed, well  nourished, in no apparent distress  SKIN:  no rashes ,  HEENT: atraumatic.  Pharynx normal without exudate.  EYES:  PERRL. Sclera anicteric.  NECK:  Supple,  no adenopathy,  thyroid normal  LUNGS:  clear to auscultation.  Effort normal  CARDIO:  RRR without murmur.   S1 and S2 normal  GI:  good BS's,  no masses,   HSM or tenderness  EXTREMITIES : no cyanosis, clubbing or edema    ASSESSMENT AND PLAN:     1. Annual physical exam  Annual physical exam.  - Comp Metabolic Panel (14); Future  - Lipid Panel; Future  - Urinalysis with Culture Reflex  - TSH W Reflex To Free T4; Future  - Vitamin D; Future    2. Chiari malformation type I (HCC)  Stable.  She has seen neurology.  Asymptomatic.    3. History of migraine  Stable.  She has seen neurology.  No new symptoms    4. Polyp of gallbladder  I did give her gallbladder ultrasound for her to get updated gallbladder ultrasound in May.  - US GALLBLADDER (CPT=76705); Future    5. Weight loss  Stable.    6. Positive MIROSLAVA (antinuclear antibody)  She does see rheumatology.  See above.    7. Vitamin D deficiency  Will update vitamin D  - Comp Metabolic Panel (14); Future  - Lipid Panel; Future  - Urinalysis with Culture Reflex  - TSH W Reflex To Free T4; Future  - Vitamin D; Future    8. Routine health maintenance  We did talk about vaccines.  She did get PCV 20 vaccine today.  She did  get COVID-vaccine in fall.    9. History of colonic polyps  Next colonoscopy December 2026    10. Need for vaccination against Streptococcus pneumoniae  20 vaccine today.  - Prevnar 20 (PCV20) [20165]    This visit was 30 minutes.  I spent 10 minutes before visit preparing and reviewing old records.  Greater than 50% of the visit was engaged in counseling and review of past data.    I did tell her that I will be retiring June 13 of this year.  She knows to establish care with a new primary care physician and she does have resources to do so.    The patient indicates understanding of these issues  and agrees to the plan.    Song Garcia MD  4/3/2025  4:29 PM

## 2025-04-07 ENCOUNTER — LAB ENCOUNTER (OUTPATIENT)
Dept: LAB | Facility: HOSPITAL | Age: 70
End: 2025-04-07
Attending: INTERNAL MEDICINE
Payer: COMMERCIAL

## 2025-04-07 ENCOUNTER — TELEPHONE (OUTPATIENT)
Dept: INTERNAL MEDICINE CLINIC | Facility: CLINIC | Age: 70
End: 2025-04-07

## 2025-04-07 DIAGNOSIS — E78.5 HYPERLIPIDEMIA, UNSPECIFIED HYPERLIPIDEMIA TYPE: Primary | ICD-10-CM

## 2025-04-07 DIAGNOSIS — Z00.00 ANNUAL PHYSICAL EXAM: ICD-10-CM

## 2025-04-07 DIAGNOSIS — E55.9 VITAMIN D DEFICIENCY: ICD-10-CM

## 2025-04-07 LAB
ALBUMIN SERPL-MCNC: 4.5 G/DL (ref 3.2–4.8)
ALBUMIN/GLOB SERPL: 2.5 {RATIO} (ref 1–2)
ALP LIVER SERPL-CCNC: 100 U/L
ALT SERPL-CCNC: 10 U/L
ANION GAP SERPL CALC-SCNC: 6 MMOL/L (ref 0–18)
AST SERPL-CCNC: 18 U/L (ref ?–34)
BILIRUB SERPL-MCNC: 0.9 MG/DL (ref 0.2–1.1)
BILIRUB UR QL: NEGATIVE
BUN BLD-MCNC: 14 MG/DL (ref 9–23)
BUN/CREAT SERPL: 17.9 (ref 10–20)
CALCIUM BLD-MCNC: 9.4 MG/DL (ref 8.7–10.4)
CHLORIDE SERPL-SCNC: 106 MMOL/L (ref 98–112)
CHOLEST SERPL-MCNC: 200 MG/DL (ref ?–200)
CLARITY UR: CLEAR
CO2 SERPL-SCNC: 29 MMOL/L (ref 21–32)
COLOR UR: COLORLESS
CREAT BLD-MCNC: 0.78 MG/DL
EGFRCR SERPLBLD CKD-EPI 2021: 81 ML/MIN/1.73M2 (ref 60–?)
FASTING PATIENT LIPID ANSWER: YES
FASTING STATUS PATIENT QL REPORTED: YES
GLOBULIN PLAS-MCNC: 1.8 G/DL (ref 2–3.5)
GLUCOSE BLD-MCNC: 88 MG/DL (ref 70–99)
GLUCOSE UR-MCNC: NORMAL MG/DL
HDLC SERPL-MCNC: 67 MG/DL (ref 40–59)
HGB UR QL STRIP.AUTO: NEGATIVE
KETONES UR-MCNC: NEGATIVE MG/DL
LDLC SERPL CALC-MCNC: 120 MG/DL (ref ?–100)
LEUKOCYTE ESTERASE UR QL STRIP.AUTO: NEGATIVE
NITRITE UR QL STRIP.AUTO: NEGATIVE
NONHDLC SERPL-MCNC: 133 MG/DL (ref ?–130)
OSMOLALITY SERPL CALC.SUM OF ELEC: 292 MOSM/KG (ref 275–295)
PH UR: 5.5 [PH] (ref 5–8)
POTASSIUM SERPL-SCNC: 3.9 MMOL/L (ref 3.5–5.1)
PROT SERPL-MCNC: 6.3 G/DL (ref 5.7–8.2)
PROT UR-MCNC: NEGATIVE MG/DL
SODIUM SERPL-SCNC: 141 MMOL/L (ref 136–145)
SP GR UR STRIP: <1.005 (ref 1–1.03)
TRIGL SERPL-MCNC: 74 MG/DL (ref 30–149)
TSI SER-ACNC: 1.17 UIU/ML (ref 0.55–4.78)
UROBILINOGEN UR STRIP-ACNC: NORMAL
VIT D+METAB SERPL-MCNC: 30 NG/ML (ref 30–100)
VLDLC SERPL CALC-MCNC: 13 MG/DL (ref 0–30)

## 2025-04-07 PROCEDURE — 80053 COMPREHEN METABOLIC PANEL: CPT

## 2025-04-07 PROCEDURE — 81003 URINALYSIS AUTO W/O SCOPE: CPT | Performed by: INTERNAL MEDICINE

## 2025-04-07 PROCEDURE — 36415 COLL VENOUS BLD VENIPUNCTURE: CPT

## 2025-04-07 PROCEDURE — 84443 ASSAY THYROID STIM HORMONE: CPT

## 2025-04-07 PROCEDURE — 80061 LIPID PANEL: CPT

## 2025-04-07 PROCEDURE — 82306 VITAMIN D 25 HYDROXY: CPT

## 2025-04-07 RX ORDER — ROSUVASTATIN CALCIUM 5 MG/1
5 TABLET, COATED ORAL NIGHTLY
Qty: 90 TABLET | Refills: 1 | Status: SHIPPED | OUTPATIENT
Start: 2025-04-07

## 2025-04-07 NOTE — TELEPHONE ENCOUNTER
Patient called back and was relayed 's message below.   Patient is willing to try a very-low dose cholesterol lowering medication.   Uses [facility] pharmacy in [place].    Patient also asking about CBC, she thought one would be drawn along with the other labs. No CBC ordered. Patient would like to have that done as there were some slight abnormalities in the past    To  to advise on CBC and Rx

## 2025-04-07 NOTE — TELEPHONE ENCOUNTER
Please let Annette know the following.    For the CBC I apologize.  I did not do one because there was one that I saw October 14,2024 with her rheumatologist.  At that time it looked fairly normal to me.  Her white blood count was 5.18 which is normal.  Her red blood count was 5.36. just 16/100 above normal.  Her hemoglobin was excellent at 14.8 and the rest of her CBC looked good.  But I can add a CBC with her lipids.    I sent in a low-dose of Crestor.  The lowest dose is 5 mg.  1 a day.  We can check lipids and her CBC in 4 to 6 weeks.

## 2025-04-07 NOTE — TELEPHONE ENCOUNTER
Please let Annette know that her labs came back fairly acceptable.    Chemistries good.    Thyroid good.    Vitamin D level is 30.  We do prefer to be 30 or above so she is right on the low normal range.    She can let her rheumatologist know that exact number and the rheumatologist may recommend particular calcium and vitamin D supplementation.    The vitamin D has improved however from past years.    Her cholesterol is 200.    There is a current calculation that can give a theoretical risk of future cardiac disease and her risk is a little bit above where would like it suggesting that she may benefit from a cholesterol medication such as Lipitor, Crestor or Pravachol.    If she is willing we can order a very low-dose of that.    If she is reluctant I totally understand.    If she wishes not to pursue a cholesterol medicine it might be wise for her to get a heart scan.  This is an easy CAT scan that can let us know if there is any calcification happening in her heart arteries that we should know about thus changing her mind about cholesterol medication.    Phone number to schedule is [phone]

## 2025-05-08 ENCOUNTER — HOSPITAL ENCOUNTER (OUTPATIENT)
Dept: ULTRASOUND IMAGING | Facility: HOSPITAL | Age: 70
Discharge: HOME | End: 2025-05-08
Attending: INTERNAL MEDICINE
Payer: COMMERCIAL

## 2025-05-08 ENCOUNTER — LAB ENCOUNTER (OUTPATIENT)
Dept: LAB | Facility: HOSPITAL | Age: 70
End: 2025-05-08
Attending: INTERNAL MEDICINE
Payer: COMMERCIAL

## 2025-05-08 DIAGNOSIS — E78.5 HYPERLIPIDEMIA, UNSPECIFIED HYPERLIPIDEMIA TYPE: ICD-10-CM

## 2025-05-08 DIAGNOSIS — K82.4 POLYP OF GALLBLADDER: ICD-10-CM

## 2025-05-08 LAB
BASOPHILS # BLD AUTO: 0.05 X10(3) UL (ref 0–0.2)
BASOPHILS NFR BLD AUTO: 1.1 %
DEPRECATED RDW RBC AUTO: 40.3 FL (ref 35.1–46.3)
EOSINOPHIL # BLD AUTO: 0.13 X10(3) UL (ref 0–0.7)
EOSINOPHIL NFR BLD AUTO: 2.8 %
ERYTHROCYTE [DISTWIDTH] IN BLOOD BY AUTOMATED COUNT: 13.1 % (ref 11–15)
HCT VFR BLD AUTO: 43.1 % (ref 35–48)
HGB BLD-MCNC: 13.7 G/DL (ref 12–16)
IMM GRANULOCYTES # BLD AUTO: 0.01 X10(3) UL (ref 0–1)
IMM GRANULOCYTES NFR BLD: 0.2 %
LYMPHOCYTES # BLD AUTO: 1.24 X10(3) UL (ref 1–4)
LYMPHOCYTES NFR BLD AUTO: 27 %
MCH RBC QN AUTO: 27.2 PG (ref 26–34)
MCHC RBC AUTO-ENTMCNC: 31.8 G/DL (ref 31–37)
MCV RBC AUTO: 85.7 FL (ref 80–100)
MONOCYTES # BLD AUTO: 0.42 X10(3) UL (ref 0.1–1)
MONOCYTES NFR BLD AUTO: 9.2 %
NEUTROPHILS # BLD AUTO: 2.74 X10 (3) UL (ref 1.5–7.7)
NEUTROPHILS # BLD AUTO: 2.74 X10(3) UL (ref 1.5–7.7)
NEUTROPHILS NFR BLD AUTO: 59.7 %
PLATELET # BLD AUTO: 202 10(3)UL (ref 150–450)
RBC # BLD AUTO: 5.03 X10(6)UL (ref 3.8–5.3)
WBC # BLD AUTO: 4.6 X10(3) UL (ref 4–11)

## 2025-05-08 PROCEDURE — 36415 COLL VENOUS BLD VENIPUNCTURE: CPT

## 2025-05-08 PROCEDURE — 76705 ECHO EXAM OF ABDOMEN: CPT | Performed by: INTERNAL MEDICINE

## 2025-05-08 PROCEDURE — 85025 COMPLETE CBC W/AUTO DIFF WBC: CPT

## 2025-05-09 ENCOUNTER — OFFICE VISIT (OUTPATIENT)
Facility: CLINIC | Age: 70
End: 2025-05-09
Payer: COMMERCIAL

## 2025-05-09 VITALS
HEART RATE: 59 BPM | WEIGHT: 119 LBS | BODY MASS INDEX: 22 KG/M2 | OXYGEN SATURATION: 97 % | RESPIRATION RATE: 18 BRPM | SYSTOLIC BLOOD PRESSURE: 118 MMHG | DIASTOLIC BLOOD PRESSURE: 73 MMHG

## 2025-05-09 DIAGNOSIS — Z12.31 SCREENING MAMMOGRAM FOR BREAST CANCER: ICD-10-CM

## 2025-05-09 DIAGNOSIS — R92.30 DENSE BREASTS: Primary | ICD-10-CM

## 2025-05-09 PROCEDURE — 3074F SYST BP LT 130 MM HG: CPT

## 2025-05-09 PROCEDURE — 3078F DIAST BP <80 MM HG: CPT

## 2025-05-09 PROCEDURE — 99214 OFFICE O/P EST MOD 30 MIN: CPT

## 2025-05-09 NOTE — PROGRESS NOTES
Breast Surgery Surveillance Visit    History of Present Illness:   Ms. Annette Hollingsworth is a 70 year old woman who presents with a prior abnormal mammogram.  She acutely denies any palpable masses, nipple discharge, skin changes or axillary symptoms.  She has no prior history of breast disease or biopsies.  She has no known family history of breast cancer.  She underwent a 3D screening mammogram on 2019 and was noted to have heterogeneously dense breast with an indeterminate asymmetry in the right breast for which additional imaging was recommended.  A right breast diagnostic evaluation was performed on 2019 and confirmed a normal-appearing lymph node at 1:00 measuring 8 mm as well as a 5 mm nodular density in the posterior inner right breast thought to be benign.  Since her last visit she has had no new concerns.  Most recent imaging was a bilateral screening mammogram in 2024 which showed no suspicious findings. Whole breast ultrasounds for dense tissue in May 2024 showed no suspicious findings.  She is here today for evaluation and recommendations for further therapy.        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    She has cumulative breastfeeding history of 25 months, last unknown.  She achieved menarche at age 11  Age of Menopause: 52  Type: Natural  She denies any history of hormone replacement therapy.  She has history of oral contraceptive use for unknown years, last unknown years ago.  She denies infertility treatment to  achieve pregnancy.    Medications:     rosuvastatin (CRESTOR) 5 MG Oral Tab Take 1 tablet (5 mg total) by mouth nightly. 90 tablet 1    XIFAXAN 550 MG Oral Tab Take 1 tablet (550 mg total) by mouth in the morning and 1 tablet (550 mg total) in the evening and 1 tablet (550 mg total) before bedtime.      Ciclopirox 8 % External Solution Apply to all affected toenails once daily following all label directions 6 mL 10    Calcium Carb-Cholecalciferol (CALCIUM 500 + D OR) Take by mouth in the morning.      metoprolol succinate ER 25 MG Oral Tablet 24 Hr daily as needed.      dicyclomine 10 MG/5ML Oral Solution Take 5 mL (10 mg total) by mouth daily as needed.         Allergies:    Allergies   Allergen Reactions    Penicillin G UNKNOWN    Penicillins RASH     Rash when young child       Family History:   Family History   Problem Relation Age of Onset    Alcohol and Other Disorders Associated Father         cirrhosis from etoh    Cancer Mother 59        lung smoker    Other (Other) Daughter         Autoimmune, platelet problem. Daughter is fragile X carrier    Diabetes Paternal Grandfather     Cancer Paternal Grandfather 70        lung smoker       She is not of Ashkenazi Adventism ancestry.    Social History:  History   Alcohol Use    1.0 standard drink of alcohol/week    1 Standard drinks or equivalent per week     Comment: beer & wine 1-2 drink on weekends       History   Smoking Status    Never   Smokeless Tobacco    Never   The patient is .  She has 3 children.  She is employed part-time.    Review of Systems:  General:   The patient denies, fever, chills, night sweats, fatigue, generalized weakness, change in appetite or weight loss.    HEENT:     The patient denies eye irritation, cataracts, redness, glaucoma, yellowing of the eyes, change in vision or color blindness. The patient denies hearing loss, ringing in the ears, ear drainage, earaches, nasal congestion, nose bleeds, snoring, pain in mouth/throat,  hoarseness, change in voice, facial trauma.    Respiratory:  The patient denies chronic cough, phlegm, hemoptysis, pleurisy/chest pain, pneumonia, asthma, wheezing, difficulty in breathing with exertion, emphysema, chronic bronchitis, shortness of breath or abnormal sound when breathing.     Cardiovascular:  There is no history of chest pain, chest pressure/discomfort, palpitations, irregular heartbeat, fainting or near-fainting, difficulty breathing when lying flat, SOB/Coughing at night, swelling of the legs or chest pain while walking.    Breasts:  See history of present illness    Gastrointestinal:     There is no history of difficulty or pain with swallowing, reflux symptoms, vomiting, dark or bloody stools, constipation, yellowing of the skin, indigestion, nausea, change in bowel habits, diarrhea, abdominal pain or vomiting blood.     Genitourinary:  The patient denies frequent urination, needing to get up at night to urinate, urinary hesitancy or retaining urine, painful urination, urinary incontinence, decreased urine stream, blood in the urine or vaginal/penile discharge.    Skin:    The patient denies rash, itching, skin lesions, dry skin, change in skin color or change in moles.     Hematologic/Lymphatic:  The patient denies easily bruising or bleeding or persistent swollen glands or lymph nodes.     Musculoskeletal:  The patient denies muscle aches/pain, joint pain, stiff joints, neck pain, back pain or bone pain.    Neuropsychiatric:  There is no history of migraines or severe headaches, seizure/epilepsy, speech problems, coordination problems, trembling/tremors, fainting/black outs, dizziness, memory problems, loss of sensation/numbness, problems walking, weakness, tingling or burning in hands/feet. There is no history of abusive relationship, bipolar disorder, sleep disturbance, anxiety, depression or feeling of despair.    Endocrine:    There is no history of poor/slow wound healing, weight  loss/gain, fertility or hormone problems, cold intolerance, thyroid disease.     Allergic/Immunologic:  There is no history of hives, hay fever, angioedema or anaphylaxis.    /73 (BP Location: Left arm, Patient Position: Sitting, Cuff Size: adult)   Pulse 59   Resp 18   Wt 54 kg (119 lb)   SpO2 97%   BMI 21.77 kg/m²     Physical Exam:  The patient is an alert, oriented, well-nourished and  well-developed woman who appears her stated age. Her speech patterns and movements are normal. Her affect is appropriate.    HEENT: The head is normocephalic. The neck is supple. The thyroid is not enlarged and is without palpable masses/nodules. There are no palpable masses. The trachea is in the midline. Conjunctiva are clear, non-icteric.    Chest: The chest expands symmetrically. The lungs are clear to auscultation.    Heart: The rhythm is regular.  There are no murmurs, rubs, gallops or thrills.    Breasts:  Her breasts are symmetrical with a cup size B.  Right breast: The skin, nipple ,and areola appear normal. There is no skin dimpling with movement of the pectoralis. There is no nipple retraction. No nipple discharge can be elicited. The parenchyma is mildly nodular. There are no dominant masses in the breast. The axillary tail is normal.  Left breast:   The skin, nipple, and areola appear normal. There is no skin dimpling with movement of the pectoralis. There is no nipple retraction. No nipple discharge can be elicited. The parenchyma is mildly nodular. There are no dominant masses in the breast. The axillary tail is normal.    Abdomen:  The abdomen is soft, flat and non tender. The liver is not enlarged. There are no palpable masses.    Lymph Nodes:  The supraclavicular, axillary and cervical regions are free of significant lymphadenopathy.    Back: There is no vertebral column tenderness.    Skin: The skin appears normal. There are no suspicious appearing rashes or lesions.    Extremities: The extremities  are without deformity, cyanosis or edema.    Impression:   Ms. Annette Hollingsworth is a 70 year old woman presents with dense breasts and stable benign-appearing right breast masses.    Discussion and Plan:  I had a discussion with the Patient regarding her breast exam. On exam today I found no clinical evidence of malignancy bilaterally.  I reviewed her recent imaging we discussed this at length.   She will need her annual mammogram in December 2025.  Due to density she qualifies for annual supplemental complete breast ultrasound and we will plan to stagger this with her mammogram in May 2025.  She will require clinical breast exam annually.  She was given ample opportunity for questions and those questions were answered to her satisfaction. She has been encouraged to contact the office with any questions or concerns as needed related to her breast health.

## 2025-05-15 ENCOUNTER — TELEPHONE (OUTPATIENT)
Dept: INTERNAL MEDICINE CLINIC | Facility: CLINIC | Age: 70
End: 2025-05-15

## 2025-05-15 NOTE — TELEPHONE ENCOUNTER
Please let Annette know that her gallbladder ultrasound is stable with a stable polyp.    For now I would suggest repeat ultrasound in 1 year as we need to make sure the polyp does not increase in size over time.    As she knows I will be retiring so please ask her to make a note that she will be due for repeat gallbladder ultrasound May 2026 ordered by her future primary care physician.    Thank you.    I copy and pasted results of gallbladder ultrasound below if needed for review.    Impression  CONCLUSION:     1. Stable appearance of a 2.6 x 1.7 mm polyp within an otherwise normal-appearing gallbladder.     2. Normal sonographic appearance of the liver and right kidney.         Dictated by (CST): Aleksander Fish MD on 5/14/2025 at 11:36 AM      Finalized by (CST): Aleksander Fish MD on 5/14/2025 at 11:37 AM

## 2025-05-15 NOTE — TELEPHONE ENCOUNTER
Patient called and relayed Dr Garcia's message. Patient verbalized understanding with no further questions.

## 2025-05-30 ENCOUNTER — OFFICE VISIT (OUTPATIENT)
Dept: DERMATOLOGY CLINIC | Facility: CLINIC | Age: 70
End: 2025-05-30
Payer: COMMERCIAL

## 2025-05-30 DIAGNOSIS — Z12.83 SKIN CANCER SCREENING: ICD-10-CM

## 2025-05-30 DIAGNOSIS — D22.9 MULTIPLE NEVI: ICD-10-CM

## 2025-05-30 DIAGNOSIS — D23.9 BENIGN NEOPLASM OF SKIN, UNSPECIFIED LOCATION: ICD-10-CM

## 2025-05-30 DIAGNOSIS — L82.1 SK (SEBORRHEIC KERATOSIS): Primary | ICD-10-CM

## 2025-05-30 DIAGNOSIS — L81.4 LENTIGO: ICD-10-CM

## 2025-05-30 PROCEDURE — 99203 OFFICE O/P NEW LOW 30 MIN: CPT | Performed by: DERMATOLOGY

## 2025-05-30 NOTE — PROGRESS NOTES
The following individual(s) verbally consented to be recorded using ambient AI listening technology and understand that they can each withdraw their consent to this listening technology at any point by asking the clinician to turn off or pause the recording:    Patient name: Annette Mejia Edel

## 2025-06-03 ENCOUNTER — PATIENT MESSAGE (OUTPATIENT)
Dept: PODIATRY CLINIC | Facility: CLINIC | Age: 70
End: 2025-06-03

## 2025-06-06 NOTE — TELEPHONE ENCOUNTER
Found PA form, attempting to fill out, but there are a couple questions I cannot answer based on the office note. Also noted that the diagnosis code is incorrect. Sent communications to both patient and provider

## 2025-06-08 NOTE — PROGRESS NOTES
Annette Hollingsworth is a 70 year old female.    CC:    Chief Complaint   Patient presents with    Derm Problem     New pt presents for mole of concern on top of left shoulder, in front of left ear and rt lower back.     Pt denies personal/family hx of Skin Ca         HISTORY:    Past Medical History[1]   Past Surgical History[2]   Family History[3]   Short Social Hx on File[4]     Current Medications[5]  Allergies:   Allergies[6]    Past Medical History[7]  Past Surgical History[8]  Social History     Socioeconomic History    Marital status:      Spouse name: Not on file    Number of children: Not on file    Years of education: Not on file    Highest education level: Not on file   Occupational History    Not on file   Tobacco Use    Smoking status: Never    Smokeless tobacco: Never   Vaping Use    Vaping status: Never Used   Substance and Sexual Activity    Alcohol use: Yes     Alcohol/week: 1.0 standard drink of alcohol     Types: 1 Standard drinks or equivalent per week     Comment: beer & wine 1-2 drink on weekends    Drug use: No    Sexual activity: Not on file   Other Topics Concern     Service Not Asked    Blood Transfusions Not Asked    Caffeine Concern Yes     Comment: Coffee 2 cups daily; Soda    Occupational Exposure Not Asked    Hobby Hazards Not Asked    Sleep Concern Not Asked    Stress Concern Not Asked    Weight Concern Not Asked    Special Diet Not Asked    Back Care Not Asked    Exercise Not Asked    Bike Helmet Not Asked    Seat Belt Not Asked    Self-Exams Not Asked    Grew up on a farm Not Asked    History of tanning Not Asked    Outdoor occupation Not Asked    Pt has a pacemaker No    Pt has a defibrillator No    Breast feeding Not Asked    Reaction to local anesthetic No   Social History Narrative    Not on file     Social Drivers of Health     Food Insecurity: Not on file   Transportation Needs: Not on file   Stress: Not on file   Housing Stability: Low Risk  (7/17/2021)     Received from [facility]    Housing Stability     Mortgage Payment Concerns?: Not on file     Number of Places Lived in the Last Year: Not on file     Unstable Housing?: Not on file     Family History[9]    HPI:     HPI:  Chief Complaint   Patient presents with    Derm Problem     New pt presents for mole of concern on top of left shoulder, in front of left ear and rt lower back.     Pt denies personal/family hx of Skin Ca       History of Present Illness  Annette Hollingsworth is a 70 year old female who presents with concerns about skin lesions.    She has several spots on her scalp, back, and left shoulder. The duration of these lesions is uncertain, though she believes one on her left shoulder may have been present for a long time. The spot on her back is rough, larger, and lighter than the others. Occasionally, these spots become irritated, protrude more, and can bleed and crust.    She has a spot on her neck, described as a 'warty thing' or skin tag, and is interested in having it removed in the future, though she is concerned about the timing due to upcoming social events.    She describes herself as having many moles and refers to herself as a 'mole lady.' Her skin is sensitive, and she occasionally experiences rashes, but they are not persistent.    She mentions that someone in her family might have been a redhead, as she has 'redhead skin.'    Patient presents with concerns above.    Patient has been in their usual state of health.     Past notes/ records and appropriate/relevant lab results including pathology and past body maps reviewed. Including outside notes/ PCP notes as appropriate. Updated and new information noted in current visit.     ROS:  Denies other relevant systemic complaints. See HPI.     History, medications, allergies reviewed as noted.    Allergies:  Penicillin g and Penicillins      There were no vitals filed for this visit.    Physical Examination:      Well-developed well-nourished patient alert oriented in no acute distress.  Exam performed of appropriate involved areas    Physical Exam  SKIN: Benign keratosis on left shoulder, larger and lighter benign keratosis on back, benign keratosis on scalp, benign keratosis on neck, and skin tag-like keratosis on neck.    Multiple light to medium brown, well marginated, uniformly pigmented, macules and papules 6 mm and less scattered on exam. pigmented lesions examined with dermoscopy benign-appearing patterns.     Waxy tannish keratotic papules scattered, cherry-red vascular papules scattered.    See map today's date for lesions noted .  See assessment and plan below for specific lesions.    Otherwise remarkable for lesions as noted on map.    See A/P  below for additional information:    Assessment / plan:    Results   Benign keratosis on left shoulder, scalp, and neck    No orders of the defined types were placed in this encounter.      Meds & Refills for this Visit:  Requested Prescriptions      No prescriptions requested or ordered in this encounter         Encounter Diagnoses   Name Primary?    SK (seborrheic keratosis) Yes    Lentigo     Multiple nevi     Benign neoplasm of skin, unspecified location     Skin cancer screening      Fall risk assessment:  Given the results of the fall risk questionnaire given today.   Suggest:   Make sure there is adequate lighting.  Eliminate throw rugs or possible sources of tripping & falling  Consider grab bars on the shower & toilet.  Hand rails on all stair cases  Ambulatory assistance devices such as cane or walker as indicate.  To ensure home safety measures.    Assessment & Plan  Benign keratosis  Multiple benign keratoses on the left shoulder, scalp, and neck, characterized by rough texture and lighter color. Currently asymptomatic, but she is concerned about appearance and potential irritation. These lesions are benign unless they become irritated, bleed, or crust.  Reassured her about their benign nature.  - Monitor for changes or symptoms.  - Consider removal if lesions become bothersome or symptomatic.  - Discussed removal options: numbing and excision or cryotherapy. Excision preferred if bothersome due to quicker healing and less scabbing compared to cryotherapy.    Recording duration: 5 minutes    Benign-appearing nevi, no atypical features on dermoscopy reassurance given monitor.     Waxy tan keratotic papules lesions in areas of concern as noted reassurance given.  Benign nature discussed.  Possibility of cryo, alphahydroxy acids over-the-counter retinol's discussed.     No other susupicious lesions on todays  exam.    Please refer to map for specific lesions.  See additional diagnoses.  Pros cons of various therapies, risks benefits discussed.Pathophysiology, terapeutic options reviewed.  See  Medications in grid.  Instructions reviewed at length.    Benign nevi, seborrheic  keratoses, cherry angiomas:  Reassurance regarding other benign skin lesions.    Monitor for new or changing lesions. Signs and symptoms of skin cancer, ABCDE's of melanoma ( additional information available at AAD.org, skincancer.org) Encourage Sunscreen (broad-spectrum, ideally mineral-based-UVA/UVB -SPF 30 or higher) use encouraged, sun protection/sun protective clothing, self exams reviewed Followup as noted RTC ---routine checkup 6 mos -one year or p.r.n.    Encounter Times   Including precharting, reviewing chart, prior notes obtaining history: 10 minutes, medical exam :10 minutes, notes on body map, plan, counseling 10minutes My total time spent caring for the patient on the day of the encounter: 30 minutes     The patient indicates understanding of these issues and agrees to the plan.  The patient is asked to return as noted in follow-up/ above.    This note was generated using Dragon voice recognition software.  Please contact me regarding any confusion resulting from errors in  recognition..  Note to patient and family: The 21st Century Cures Act makes medical notes like these available to patients. However, be advised this is a medical document. It is intended as drhm-ie-dptt communication and monitoring of a patient's care needs. It is written in medical language and may contain abbreviations or verbiage that are unfamiliar. It may appear blunt or direct. Medical documents are intended to carry relevant information, facts as evident and the clinical opinion of the practitioner.         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 Daughter is fragile X carrier    Diabetes Paternal Grandfather     Cancer Paternal Grandfather 70        lung smoker   [4]   Social History  Socioeconomic History    Marital status:    Tobacco Use    Smoking status: Never    Smokeless tobacco: Never   Vaping Use    Vaping status: Never Used   Substance and Sexual Activity    Alcohol use: Yes     Alcohol/week: 1.0 standard drink of alcohol     Types: 1 Standard drinks or equivalent per week     Comment: beer & wine 1-2 drink on weekends    Drug use: No   Other Topics Concern    Caffeine Concern Yes     Comment: Coffee 2 cups daily; Soda    Pt has a pacemaker No    Pt has a defibrillator No    Reaction to local anesthetic No     Social Drivers of Health      Received from [facility]    Housing  Stability   [5]   Current Outpatient Medications   Medication Sig Dispense Refill    XIFAXAN 550 MG Oral Tab Take 1 tablet (550 mg total) by mouth in the morning and 1 tablet (550 mg total) in the evening and 1 tablet (550 mg total) before bedtime.      Ciclopirox 8 % External Solution Apply to all affected toenails once daily following all label directions 6 mL 10    Calcium Carb-Cholecalciferol (CALCIUM 500 + D OR) Take by mouth in the morning.      metoprolol succinate ER 25 MG Oral Tablet 24 Hr daily as needed.      dicyclomine 10 MG/5ML Oral Solution Take 5 mL (10 mg total) by mouth daily as needed.      rosuvastatin (CRESTOR) 5 MG Oral Tab Take 1 tablet (5 mg total) by mouth nightly. 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 Daughter is fragile X carrier    Diabetes Paternal Grandfather     Cancer Paternal Grandfather 70        lung smoker

## 2025-06-09 ENCOUNTER — HOSPITAL ENCOUNTER (OUTPATIENT)
Dept: ULTRASOUND IMAGING | Facility: HOSPITAL | Age: 70
Discharge: HOME | End: 2025-06-09
Payer: COMMERCIAL

## 2025-06-09 DIAGNOSIS — R92.30 DENSE BREASTS: ICD-10-CM

## 2025-06-09 PROCEDURE — 76641 ULTRASOUND BREAST COMPLETE: CPT

## 2025-06-09 NOTE — TELEPHONE ENCOUNTER
I am trying to fill out the prior authorization, could you please supply me with reasons why this dose and strength was chosen for patient? It is a question on the form I cannot answer.

## (undated) NOTE — MR AVS SNAPSHOT
[facility] - [place] [address]  [address]  [phone]               Thank you for choosing us for your health care visit with Ana Maldonado MD.  We are glad to serve you and happy to provide you with this summary of

## (undated) NOTE — LETTER
AUTHORIZATION FOR SURGICAL OPERATION OR OTHER PROCEDURE    1. I hereby authorize Dr. Awais Coyne, and [facility] staff assigned to my case to perform the following operation and/or procedure at the [facility] site:    _______________________________________________________________________________________________    Cortisone Injection Right Foot  _______________________________________________________________________________________________    2.  My physician has explained the nature and purpose of the operation or other procedure, possible alternative methods of treatment, the risks involved, and the possibility of complication to me.  I acknowledge that no guarantee has been made as to the result that may be obtained.  3.  I recognize that, during the course of this operation, or other procedure, unforseen conditions may necessitate additional or different procedure than those listed above.  I, therefore, further authorize and request that the above named physician, his/her physician assistants or designees perform such procedures as are, in his/her professional opinion, necessary and desirable.  4.  Any tissue or organs removed in the operation or other procedure may be disposed of by and at the discretion of the [facility] and [facility].  5.  I understand that in the event of a medical emergency, I will be transported by local paramedics to [facility] or other hospital emergency department.  6.  I certify that I have read and fully understand the above consent to operation and/or other procedure.    7.  I acknowledge that my physician has explained sedation/analgesia administration to me including the risks and benefits.  I consent to the administration of sedation/analgesia as may be necessary or desirable in the judgement of my physician.    Witness signature: ___________________________________________________ Date:   ______/______/_____                    Time:  ________ A.M.  P.M.       Patient Name:  ______________________________________________________  (please print)      Patient signature:  ___________________________________________________             Relationship to Patient:           []  Parent    Responsible person                          []  Spouse  In case of minor or                    [] Other  _____________   Incompetent name:  __________________________________________________                               (please print)      _____________      Responsible person  In case of minor or  Incompetent signature:  _______________________________________________    Statement of Physician  My signature below affirms that prior to the time of the procedure, I have explained to the patient and/or his/her guardian, the risks and benefits involved in the proposed treatment and any reasonable alternative to the proposed treatment.  I have also explained the risks and benefits involved in the refusal of the proposed treatment and have answered the patient's questions.                        Date:  ______/______/_______  Provider                      Signature:  __________________________________________________________       Time:  ___________ A.M    P.M.

## (undated) NOTE — MR AVS SNAPSHOT
After Visit Summary   6/13/2024    Annette Hollingsworth   MRN: [id number]           Visit Information     Date & Time  6/13/2024  8:00 AM Provider  Hemalatha Oh MD Department  [facility] - OB/GYN Dept. Phone  [phone]      Your Vitals Were  Most recent update: 6/13/2024  8:12 AM    BP   120/79    Pulse   58    Wt   115 lb    BMI   21.03 kg/m²          Allergies as of 6/13/2024  Review status set to Review Complete on 6/13/2024       Noted Reaction Type Reactions    Penicillin G 11/17/2015    UNKNOWN    Penicillins 11/17/2015    RASH    Rash when young child      Your Current Medications        Dosage    Calcium Carb-Cholecalciferol (CALCIUM 500 + D OR) Take by mouth daily.    metoprolol succinate ER 25 MG Oral Tablet 24 Hr daily as needed.    dicyclomine 10 MG/5ML Oral Solution Take 5 mL (10 mg total) by mouth daily as needed.    Cyanocobalamin (VITAMIN B 12 OR) Take by mouth daily.      Diagnoses for This Visit    Encounter for gynecological examination without abnormal finding   [[id number]]  -  Primary           Follow-up    Return in about 1 year (around 6/13/2025) for annual gyne exam.     We Ordered the Following     Normal Orders This Visit    Hpv Dna  High Risk , Thin Prep Collect [[id number] CUSTOM]     THINPREP PAP SMEAR ONLY [[id number] CUSTOM]     ThinPrep PAP Smear [[id number] CUSTOM]       Future Appointments        Provider Department    6/18/2024 7:30 AM [facility] Ultrasound    12/16/2024 11:20 AM [address] [facility]      Follow-up Instructions    Return in about 1 year (around 6/13/2025) for annual gyne exam.                  Did you know that [facility] primary care physicians now offer Video Visits through [employer] for adult patients for a variety of conditions such as allergies, back pain and cold symptoms? Skip the drive and waiting room and online chat with a doctor face-to-face using your  web-cam enabled computer or mobile device wherever you are. Video Visits cost $50 and can be paid hassle-free using a credit, debit, or health savings card.  Not active on [employer]? Ask us how to get signed up today!          If you receive a survey from [employer], please take a few minutes to complete it and provide feedback. We strive to deliver the best patient experience and are looking for ways to make improvements. Your feedback will help us do so. For more information on [employer], please visit [email or web address]           No text in SmartText           No text in SmartText

## (undated) NOTE — ED AVS SNAPSHOT
[facility] in [address] [facility] [address] [address]    Phone:  [phone]    Fax:  [phone]           Ms. Sabrina Levine   MRN: [id number]    Department:  [facility] in [address] [place] [address]   Date of Vi benefit level being available to you or other limited reimbursement. The physician may seek payment directly from you for amounts other than your deductible, co-payment, or co-insurance and for other services not covered under your health insurance plan. If you believe that any of the medications or instructions on this list is different from what your Primary Care doctor has instructed you - please continue to take your medications as instructed by your Primary Care doctor until you can check with your do Patient [address] to help you get signed up for insurance coverage. Patient [address] is a Federal Navigator program that can help with your Affordable Care Act coverage, as well as all types of Medicaid plans.   To get signed up and covere

## (undated) NOTE — MR AVS SNAPSHOT
[facility] [place] [address]  [address]  [phone]               Thank you for choosing us for your health care visit with Jenny Alva MD.  We are glad to serve you and happy to provide you with this summary of